# Patient Record
Sex: MALE | Race: WHITE | Employment: FULL TIME | URBAN - METROPOLITAN AREA
[De-identification: names, ages, dates, MRNs, and addresses within clinical notes are randomized per-mention and may not be internally consistent; named-entity substitution may affect disease eponyms.]

---

## 2022-02-28 ENCOUNTER — HOSPITAL ENCOUNTER (OUTPATIENT)
Dept: REHABILITATION | Age: 57
Discharge: HOME | End: 2022-02-28
Payer: COMMERCIAL

## 2022-02-28 ENCOUNTER — HOSPITAL ENCOUNTER (OUTPATIENT)
Dept: SURGERY | Age: 57
Discharge: HOME | End: 2022-02-28
Payer: COMMERCIAL

## 2022-02-28 VITALS
SYSTOLIC BLOOD PRESSURE: 121 MMHG | RESPIRATION RATE: 16 BRPM | BODY MASS INDEX: 23.75 KG/M2 | DIASTOLIC BLOOD PRESSURE: 81 MMHG | OXYGEN SATURATION: 97 % | HEIGHT: 75 IN | WEIGHT: 191 LBS | HEART RATE: 58 BPM | TEMPERATURE: 98.3 F

## 2022-02-28 DIAGNOSIS — R06.83 SNORING: Primary | ICD-10-CM

## 2022-02-28 LAB
ANION GAP SERPL CALC-SCNC: 6 MMOL/L (ref 7–16)
APTT PPP: 31.1 SEC (ref 24.1–35.1)
ATRIAL RATE: 55 BPM
BASOPHILS # BLD: 0.1 K/UL (ref 0–0.2)
BASOPHILS NFR BLD: 1 % (ref 0–2)
BUN SERPL-MCNC: 13 MG/DL (ref 6–23)
CALCIUM SERPL-MCNC: 9.5 MG/DL (ref 8.3–10.4)
CALCULATED P AXIS, ECG09: 11 DEGREES
CALCULATED R AXIS, ECG10: 38 DEGREES
CALCULATED T AXIS, ECG11: 65 DEGREES
CHLORIDE SERPL-SCNC: 107 MMOL/L (ref 98–107)
CO2 SERPL-SCNC: 30 MMOL/L (ref 21–32)
CREAT SERPL-MCNC: 1.03 MG/DL (ref 0.8–1.5)
DIAGNOSIS, 93000: NORMAL
DIFFERENTIAL METHOD BLD: ABNORMAL
EOSINOPHIL # BLD: 0.3 K/UL (ref 0–0.8)
EOSINOPHIL NFR BLD: 5 % (ref 0.5–7.8)
ERYTHROCYTE [DISTWIDTH] IN BLOOD BY AUTOMATED COUNT: 12.5 % (ref 11.9–14.6)
EST. AVERAGE GLUCOSE BLD GHB EST-MCNC: NORMAL MG/DL
GLUCOSE SERPL-MCNC: 105 MG/DL (ref 65–100)
HBA1C MFR BLD: 4.9 % (ref 4.2–6.3)
HCT VFR BLD AUTO: 42.5 % (ref 41.1–50.3)
HGB BLD-MCNC: 14.9 G/DL (ref 13.6–17.2)
IMM GRANULOCYTES # BLD AUTO: 0 K/UL (ref 0–0.5)
IMM GRANULOCYTES NFR BLD AUTO: 0 % (ref 0–5)
INR PPP: 1
LYMPHOCYTES # BLD: 1.7 K/UL (ref 0.5–4.6)
LYMPHOCYTES NFR BLD: 25 % (ref 13–44)
MCH RBC QN AUTO: 31.5 PG (ref 26.1–32.9)
MCHC RBC AUTO-ENTMCNC: 35.1 G/DL (ref 31.4–35)
MCV RBC AUTO: 89.9 FL (ref 79.6–97.8)
MONOCYTES # BLD: 0.6 K/UL (ref 0.1–1.3)
MONOCYTES NFR BLD: 8 % (ref 4–12)
NEUTS SEG # BLD: 4.2 K/UL (ref 1.7–8.2)
NEUTS SEG NFR BLD: 61 % (ref 43–78)
NRBC # BLD: 0 K/UL (ref 0–0.2)
P-R INTERVAL, ECG05: 120 MS
PLATELET # BLD AUTO: 268 K/UL (ref 150–450)
PMV BLD AUTO: 10.6 FL (ref 9.4–12.3)
POTASSIUM SERPL-SCNC: 4.4 MMOL/L (ref 3.5–5.1)
PROTHROMBIN TIME: 13.5 SEC (ref 12.6–14.5)
Q-T INTERVAL, ECG07: 388 MS
QRS DURATION, ECG06: 92 MS
QTC CALCULATION (BEZET), ECG08: 371 MS
RBC # BLD AUTO: 4.73 M/UL (ref 4.23–5.6)
SODIUM SERPL-SCNC: 143 MMOL/L (ref 136–145)
VENTRICULAR RATE, ECG03: 55 BPM
WBC # BLD AUTO: 6.8 K/UL (ref 4.3–11.1)

## 2022-02-28 PROCEDURE — 87641 MR-STAPH DNA AMP PROBE: CPT

## 2022-02-28 PROCEDURE — 94760 N-INVAS EAR/PLS OXIMETRY 1: CPT

## 2022-02-28 PROCEDURE — 77030027138 HC INCENT SPIROMETER -A

## 2022-02-28 PROCEDURE — 83036 HEMOGLOBIN GLYCOSYLATED A1C: CPT

## 2022-02-28 PROCEDURE — 85610 PROTHROMBIN TIME: CPT

## 2022-02-28 PROCEDURE — 36415 COLL VENOUS BLD VENIPUNCTURE: CPT

## 2022-02-28 PROCEDURE — 93005 ELECTROCARDIOGRAM TRACING: CPT

## 2022-02-28 PROCEDURE — 97161 PT EVAL LOW COMPLEX 20 MIN: CPT

## 2022-02-28 PROCEDURE — 85025 COMPLETE CBC W/AUTO DIFF WBC: CPT

## 2022-02-28 PROCEDURE — 85730 THROMBOPLASTIN TIME PARTIAL: CPT

## 2022-02-28 PROCEDURE — 80048 BASIC METABOLIC PNL TOTAL CA: CPT

## 2022-02-28 RX ORDER — PROPRANOLOL HYDROCHLORIDE 80 MG/1
80 CAPSULE, EXTENDED RELEASE ORAL DAILY
COMMUNITY

## 2022-02-28 NOTE — ADVANCED PRACTICE NURSE
Total Joint Surgery Preoperative Chart Review      Patient ID:  Lakeshia Knowles  [id number]  55 y.o.  1965  Surgeon: Dr. Arnulfo Ramos  Date of Surgery: 3/17/2022  Procedure: Total Right Knee Arthroplasty  Primary Care Physician: Montey Aschoff, [phone]  Specialty Physician(s):      Subjective:   Lakeshia Knowles is a 49 y.o. WHITE/NON- male who presents for preoperative evaluation for Total Right Knee arthroplasty. This is a preoperative chart review note based on data collected by the nurse at the surgical Pre-Assessment visit. Past Medical History:   Diagnosis Date    HTN (hypertension)     Migraines     Personal history of COVID-19 11/2020    fatigue, loss of taste & smell      Past Surgical History:   Procedure Laterality Date    HX CHOLECYSTECTOMY  03/2019    HX COLONOSCOPY  03/2019    HX ENDOSCOPY  03/2019    HX HIP REPLACEMENT Right 12/2001     History reviewed. No pertinent family history. Social History     Tobacco Use    Smoking status: Never Smoker    Smokeless tobacco: Never Used   Substance Use Topics    Alcohol use: Never       Prior to Admission medications    Medication Sig Start Date End Date Taking? Authorizing Provider   propranolol LA (INDERAL LA) 80 mg SR capsule Take 80 mg by mouth daily. Indications: migraine prevention   Yes Provider, Historical   lisinopriL (PRINIVIL, ZESTRIL) 10 mg tablet Take 10 mg by mouth daily.  3/4/21 3/4/22 Yes Provider, Historical     Allergies   Allergen Reactions    Penicillins Other (comments)     Childhood allergy    Erythromycin Other (comments)          Objective:     Physical Exam:   Patient Vitals for the past 24 hrs:   Temp Pulse Resp BP SpO2   02/28/22 0903 98.3 °F (36.8 °C) (!) 58 16 121/81 97 %       ECG:    EKG Results     Procedure 720 Value Units Date/Time    EKG, 12 LEAD, INITIAL [[id number]] Collected: 02/28/22 0921    Order Status: Completed Updated: 02/28/22 0949     Ventricular Rate 55 BPM      Atrial Rate 55 BPM      P-R Interval 120 ms      QRS Duration 92 ms      Q-T Interval 388 ms      QTC Calculation (Bezet) 371 ms      Calculated P Axis 11 degrees      Calculated R Axis 38 degrees      Calculated T Axis 65 degrees      Diagnosis --     Sinus bradycardia  Otherwise normal ECG  When compared with ECG of 28-FEB-2022 09:20,  No significant change was found            Data Review:   Labs:   Recent Results (from the past 24 hour(s))   EKG, 12 LEAD, INITIAL    Collection Time: 02/28/22  9:21 AM   Result Value Ref Range    Ventricular Rate 55 BPM    Atrial Rate 55 BPM    P-R Interval 120 ms    QRS Duration 92 ms    Q-T Interval 388 ms    QTC Calculation (Bezet) 371 ms    Calculated P Axis 11 degrees    Calculated R Axis 38 degrees    Calculated T Axis 65 degrees    Diagnosis       Sinus bradycardia  Otherwise normal ECG  When compared with ECG of 28-FEB-2022 09:20,  No significant change was found     CBC WITH AUTOMATED DIFF    Collection Time: 02/28/22  9:30 AM   Result Value Ref Range    WBC 6.8 4.3 - 11.1 K/uL    RBC 4.73 4.23 - 5.6 M/uL    HGB 14.9 13.6 - 17.2 g/dL    HCT 42.5 41.1 - 50.3 %    MCV 89.9 79.6 - 97.8 FL    MCH 31.5 26.1 - 32.9 PG    MCHC 35.1 (H) 31.4 - 35.0 g/dL    RDW 12.5 11.9 - 14.6 %    PLATELET 921 547 - 161 K/uL    MPV 10.6 9.4 - 12.3 FL    ABSOLUTE NRBC 0.00 0.0 - 0.2 K/uL    DF AUTOMATED      NEUTROPHILS 61 43 - 78 %    LYMPHOCYTES 25 13 - 44 %    MONOCYTES 8 4.0 - 12.0 %    EOSINOPHILS 5 0.5 - 7.8 %    BASOPHILS 1 0.0 - 2.0 %    IMMATURE GRANULOCYTES 0 0.0 - 5.0 %    ABS. NEUTROPHILS 4.2 1.7 - 8.2 K/UL    ABS. LYMPHOCYTES 1.7 0.5 - 4.6 K/UL    ABS. MONOCYTES 0.6 0.1 - 1.3 K/UL    ABS. EOSINOPHILS 0.3 0.0 - 0.8 K/UL    ABS. BASOPHILS 0.1 0.0 - 0.2 K/UL    ABS. IMM.  GRANS. 0.0 0.0 - 0.5 K/UL   HEMOGLOBIN A1C WITH EAG    Collection Time: 02/28/22  9:30 AM   Result Value Ref Range    Hemoglobin A1c 4.9 4.20 - 6.30 %    Est. average glucose Cannot be calculated mg/dL   METABOLIC PANEL, BASIC Collection Time: 02/28/22  9:30 AM   Result Value Ref Range    Sodium 143 136 - 145 mmol/L    Potassium 4.4 3.5 - 5.1 mmol/L    Chloride 107 98 - 107 mmol/L    CO2 30 21 - 32 mmol/L    Anion gap 6 (L) 7 - 16 mmol/L    Glucose 105 (H) 65 - 100 mg/dL    BUN 13 6 - 23 MG/DL    Creatinine 1.03 0.8 - 1.5 MG/DL    GFR est AA >60 >60 ml/min/1.73m2    GFR est non-AA >60 >60 ml/min/1.73m2    Calcium 9.5 8.3 - 10.4 MG/DL   PROTHROMBIN TIME + INR    Collection Time: 02/28/22  9:30 AM   Result Value Ref Range    Prothrombin time 13.5 12.6 - 14.5 sec    INR 1.0     PTT    Collection Time: 02/28/22  9:30 AM   Result Value Ref Range    aPTT 31.1 24.1 - 35.1 SEC         Problem List:  )  Patient Active Problem List   Diagnosis Code    Snoring R06.83       Total Joint Surgery Pre-Assessment Recommendations:           Patient reports the symptoms of snoring, fatigue, observed apnea and /or excessive daytime sleepiness. Will refer patient for HST based on above assessment. Recommend continuous saturation monitoring hours of sleep, during hospitalization.         Signed By: JACQUELIN Rodriguez    February 28, 2022

## 2022-02-28 NOTE — PERIOP NOTES
PLEASE CONTINUE TAKING ALL PRESCRIPTION MEDICATIONS UP TO THE DAY OF SURGERY UNLESS OTHERWISE DIRECTED BELOW. DISCONTINUE all vitamins and supplements 21 days prior to surgery. DISCONTINUE Non-Steriodal Anti-Inflammatory (NSAIDS) such as Advil and Aleve 5 days prior to surgery. Home Medications to take  the day of surgery      Propranolol           Home Medications   to Hold           Comments      On the day before surgery please take Acetaminophen 1000mg in the morning and then again before bed. You may substitute for Tylenol 650 mg. Please do not bring home medications with you on the day of surgery unless otherwise directed by your nurse. If you are instructed to bring home medications, please give them to your nurse as they will be administered by the nursing staff. If you have any questions, please call Olga Chavez [phone]     A copy of this note was provided to the patient for reference.

## 2022-02-28 NOTE — PERIOP NOTES
Patient verified name and . Order for consent is found in EHR and matches case posting; patient verified. Type 3 surgery, Joint camp assessment complete. Labs per surgeon: CBC,BMP, PT/PTT, mrsa swab, hgba1c ; results (processing)  Labs per anesthesia protocol: no additional  EKG:done today - within [facility] protocols. MRSA/MSSA swab collected; pharmacy to review and dose antibiotic as appropriate. Hospital approved surgical skin cleanser and instructions to return bottle on DOS given per hospital policy. Patient provided with handouts including Guide to Surgery, Pain Management, Hand Hygiene, Blood Transfusion Education, and [facility] Brochure. Patient answered medical/surgical history questions at their best of ability. All prior to admission medications documented in [facility]. Original medication prescription bottle not visualized during patient appointment. Patient instructed to hold all vitamins 3 weeks prior to surgery and NSAIDS 5 days prior to surgery. Patient teach back successful and patient demonstrates knowledge of instruction.

## 2022-02-28 NOTE — PERIOP NOTES
Your patient recently had labs drawn during a hospital appointment due to an upcoming surgery. The results are attached. If you have any questions or concerns please reach out to your patient for a follow-up in your office. Please do not respond to this message as my mailbox is not monitored. You may call [phone] with questions or concerns. Recent Results (from the past 12 hour(s))   EKG, 12 LEAD, INITIAL    Collection Time: 02/28/22  9:21 AM   Result Value Ref Range    Ventricular Rate 55 BPM    Atrial Rate 55 BPM    P-R Interval 120 ms    QRS Duration 92 ms    Q-T Interval 388 ms    QTC Calculation (Bezet) 371 ms    Calculated P Axis 11 degrees    Calculated R Axis 38 degrees    Calculated T Axis 65 degrees    Diagnosis       Sinus bradycardia  Otherwise normal ECG  When compared with ECG of 28-FEB-2022 09:20,  No significant change was found     CBC WITH AUTOMATED DIFF    Collection Time: 02/28/22  9:30 AM   Result Value Ref Range    WBC 6.8 4.3 - 11.1 K/uL    RBC 4.73 4.23 - 5.6 M/uL    HGB 14.9 13.6 - 17.2 g/dL    HCT 42.5 41.1 - 50.3 %    MCV 89.9 79.6 - 97.8 FL    MCH 31.5 26.1 - 32.9 PG    MCHC 35.1 (H) 31.4 - 35.0 g/dL    RDW 12.5 11.9 - 14.6 %    PLATELET 992 532 - 534 K/uL    MPV 10.6 9.4 - 12.3 FL    ABSOLUTE NRBC 0.00 0.0 - 0.2 K/uL    DF AUTOMATED      NEUTROPHILS 61 43 - 78 %    LYMPHOCYTES 25 13 - 44 %    MONOCYTES 8 4.0 - 12.0 %    EOSINOPHILS 5 0.5 - 7.8 %    BASOPHILS 1 0.0 - 2.0 %    IMMATURE GRANULOCYTES 0 0.0 - 5.0 %    ABS. NEUTROPHILS 4.2 1.7 - 8.2 K/UL    ABS. LYMPHOCYTES 1.7 0.5 - 4.6 K/UL    ABS. MONOCYTES 0.6 0.1 - 1.3 K/UL    ABS. EOSINOPHILS 0.3 0.0 - 0.8 K/UL    ABS. BASOPHILS 0.1 0.0 - 0.2 K/UL    ABS. IMM.  GRANS. 0.0 0.0 - 0.5 K/UL   HEMOGLOBIN A1C WITH EAG    Collection Time: 02/28/22  9:30 AM   Result Value Ref Range    Hemoglobin A1c 4.9 4.20 - 6.30 %    Est. average glucose Cannot be calculated mg/dL   METABOLIC PANEL, BASIC    Collection Time: 02/28/22  9:30 AM Result Value Ref Range    Sodium 143 136 - 145 mmol/L    Potassium 4.4 3.5 - 5.1 mmol/L    Chloride 107 98 - 107 mmol/L    CO2 30 21 - 32 mmol/L    Anion gap 6 (L) 7 - 16 mmol/L    Glucose 105 (H) 65 - 100 mg/dL    BUN 13 6 - 23 MG/DL    Creatinine 1.03 0.8 - 1.5 MG/DL    GFR est AA >60 >60 ml/min/1.73m2    GFR est non-AA >60 >60 ml/min/1.73m2    Calcium 9.5 8.3 - 10.4 MG/DL   PROTHROMBIN TIME + INR    Collection Time: 02/28/22  9:30 AM   Result Value Ref Range    Prothrombin time 13.5 12.6 - 14.5 sec    INR 1.0     PTT    Collection Time: 02/28/22  9:30 AM   Result Value Ref Range    aPTT 31.1 24.1 - 35.1 SEC

## 2022-02-28 NOTE — PERIOP NOTES
Labs done today within [facility] protocols. Recent Results (from the past 12 hour(s))   EKG, 12 LEAD, INITIAL    Collection Time: 02/28/22  9:21 AM   Result Value Ref Range    Ventricular Rate 55 BPM    Atrial Rate 55 BPM    P-R Interval 120 ms    QRS Duration 92 ms    Q-T Interval 388 ms    QTC Calculation (Bezet) 371 ms    Calculated P Axis 11 degrees    Calculated R Axis 38 degrees    Calculated T Axis 65 degrees    Diagnosis       Sinus bradycardia  Otherwise normal ECG  When compared with ECG of 28-FEB-2022 09:20,  No significant change was found     CBC WITH AUTOMATED DIFF    Collection Time: 02/28/22  9:30 AM   Result Value Ref Range    WBC 6.8 4.3 - 11.1 K/uL    RBC 4.73 4.23 - 5.6 M/uL    HGB 14.9 13.6 - 17.2 g/dL    HCT 42.5 41.1 - 50.3 %    MCV 89.9 79.6 - 97.8 FL    MCH 31.5 26.1 - 32.9 PG    MCHC 35.1 (H) 31.4 - 35.0 g/dL    RDW 12.5 11.9 - 14.6 %    PLATELET 460 120 - 708 K/uL    MPV 10.6 9.4 - 12.3 FL    ABSOLUTE NRBC 0.00 0.0 - 0.2 K/uL    DF AUTOMATED      NEUTROPHILS 61 43 - 78 %    LYMPHOCYTES 25 13 - 44 %    MONOCYTES 8 4.0 - 12.0 %    EOSINOPHILS 5 0.5 - 7.8 %    BASOPHILS 1 0.0 - 2.0 %    IMMATURE GRANULOCYTES 0 0.0 - 5.0 %    ABS. NEUTROPHILS 4.2 1.7 - 8.2 K/UL    ABS. LYMPHOCYTES 1.7 0.5 - 4.6 K/UL    ABS. MONOCYTES 0.6 0.1 - 1.3 K/UL    ABS. EOSINOPHILS 0.3 0.0 - 0.8 K/UL    ABS. BASOPHILS 0.1 0.0 - 0.2 K/UL    ABS. IMM.  GRANS. 0.0 0.0 - 0.5 K/UL   HEMOGLOBIN A1C WITH EAG    Collection Time: 02/28/22  9:30 AM   Result Value Ref Range    Hemoglobin A1c 4.9 4.20 - 6.30 %    Est. average glucose Cannot be calculated mg/dL   METABOLIC PANEL, BASIC    Collection Time: 02/28/22  9:30 AM   Result Value Ref Range    Sodium 143 136 - 145 mmol/L    Potassium 4.4 3.5 - 5.1 mmol/L    Chloride 107 98 - 107 mmol/L    CO2 30 21 - 32 mmol/L    Anion gap 6 (L) 7 - 16 mmol/L    Glucose 105 (H) 65 - 100 mg/dL    BUN 13 6 - 23 MG/DL    Creatinine 1.03 0.8 - 1.5 MG/DL    GFR est AA >60 >60 ml/min/1.73m2    GFR est non-AA >60 >60 ml/min/1.73m2    Calcium 9.5 8.3 - 10.4 MG/DL   PROTHROMBIN TIME + INR    Collection Time: 02/28/22  9:30 AM   Result Value Ref Range    Prothrombin time 13.5 12.6 - 14.5 sec    INR 1.0     PTT    Collection Time: 02/28/22  9:30 AM   Result Value Ref Range    aPTT 31.1 24.1 - 35.1 SEC

## 2022-02-28 NOTE — PROGRESS NOTES
Angel Wan  : (57 y.o.) [facility] at [place] [address].  [phone]       Physical Therapy Prehab Plan of Treatment and Evaluation Summary:2022    ICD-10: Treatment Diagnosis:   · Pain in Right Knee (M25.561)  · Stiffness of Right Knee, Not elsewhere classified (M25.661)  · Difficulty in walking, Not elsewhere classified (R26.2)  Precautions/Allergies:   Penicillins and Erythromycin  MEDICAL/REFERRING DIAGNOSIS:  Unilateral primary osteoarthritis, right knee [M17.11]  REFERRING PHYSICIAN: Glenys Colindres,*  DATE OF SURGERY: 3/17/22    Assessment:   Comments:  Pt. Plans to go home with spouse. He had a right malik in . He has partial right foot drop but does not use a brace. PROBLEM LIST (Impacting functional limitations):  Mr. Sherry Friedman presents with the following right lower extremity(s) problems:  1. Strength  2. Range of Motion  3. Home Exercise Program  4. Pain   INTERVENTIONS PLANNED:  1. Home Exercise Program  2. Educational Discussion      TREATMENT PLAN: Effective Dates: 2022 TO 2022. Frequency/Duration: Patient to continue to perform home exercise program at least twice per day up until his surgery. GOALS: (Goals have been discussed and agreed upon with patient.)  Discharge Goals: Time Frame: 1 Day  1. Patient will demonstrate independence with a home exercise program designed to increase strength, range of motion and pain control to minimize functional deficits and optimize patient for total joint replacement. Rehabilitation Potential For Stated Goals: Good  Regarding Dieter Crowley therapy, I certify that the treatment plan above will be carried out by a therapist or under their direction.   Thank you for this referral,  Braden Rivero, PT               HISTORY:   Present Symptoms:  Pain Intensity 1:  (8 at worst)  Pain Location 1: Knee   History of Present Injury/Illness (Reason for Referral):  Medical/Referring Diagnosis: Unilateral primary osteoarthritis, right knee [M17.11]   Past Medical History/Comorbidities:   Mr. Naya Mac  has a past medical history of HTN (hypertension), Migraines, and Personal history of COVID-19 (11/2020). Mr. Naya Mac  has a past surgical history that includes hx hip replacement (Right, 12/2001); hx cholecystectomy (03/2019); hx colonoscopy (03/2019); and hx endoscopy (03/2019).   Social History/Living Environment:   Home Environment: Private residence  # Steps to Enter: 4  Rails to Enter: No  One/Two Story Residence: One story  Living Alone: No  Support Systems: Spouse/Significant Other  Patient Expects to be Discharged to[de-identified] Home  Current DME Used/Available at Home: None  Tub or Shower Type: Tub/Shower combination    Work/Activity:  Desk work  Dominant Side:  RIGHT  Current Medications:  See Pre-assessment nursing note   Number of Personal Factors/Comorbidities that affect the Plan of Care: 1-2: MODERATE COMPLEXITY   EXAMINATION:   ADLs (Current Functional Status):   Ambulation:  [x] Independent  [] Walk Indoors Only  [] Walk Outdoors  [] Use Assistive Device  [] Use Wheelchair Only Dressing:  [x] [address] from Someone for:  [] Sock/Shoes  [] Pants  [] Everything   Bathing/Showering:   [x] Independent  [] Requires Assistance from Someone  [] [address] Only Household Activities:  [x] Routine house and yard work  [] Light Housework Only  [] None   Observation/Orthostatic Postural Assessment:   Exceptions to WDL   ROM/Flexibility:   Gross Assessment: Yes  AROM: Within functional limits (left LE)                       RLE Assessment  RLE Assessment (WDL): Exceptions to WDL  RLE AROM  R Knee Flexion: 127  R Knee Extension: 11   Strength:   Gross Assessment: Yes  Strength: Generally decreased, functional (left LE)              RLE Strength  R Knee Flexion: 4  R Knee Extension: 4   Functional Mobility:    Gross Assessment: Yes    Gait Description (WDL): Exceptions to WDL  Stand to Sit: Independent  Sit to Stand: Independent  Distance (ft): 200 Feet (ft)  Ambulation - Level of Assistance: Independent  Stance: Right decreased  Gait Abnormalities: Foot drop          Balance:    Sitting: Intact  Standing: Intact   Body Structures Involved:  1. Bones  2. Joints  3. Muscles  4. Ligaments Body Functions Affected:  1. Movement Related Activities and Participation Affected:  1. Mobility   Number of elements that affect the Plan of Care: 3: MODERATE COMPLEXITY   CLINICAL PRESENTATION:   Presentation: Stable and uncomplicated: LOW COMPLEXITY   CLINICAL DECISION MAKING:   Tool Used: Knee injury and Osteoarthritis Outcome Score for Joint Replacement (KOOS, JR)  Score:  Initial: 17 (Interval: 44.905) 2/28/2022 Most Recent: TBD   Interpretation of Score: The KOOS, JR contains 7 items from the original KOOS survey. Items are coded from 0 to 4, none to extreme respectively. Shavon Vaca is scored by summing the raw response (range 0-28) and then converting it to an interval score using the table provided below. The interval score ranges from 0 to 100 where 0 represents total knee disability and 100 represents perfect knee health. Medical Necessity:   · Mr. Taz Gallardo is expected to optimize his lower extremity strength and ROM in preparation for joint replacement surgery. Reason for Services/Other Comments:  · Achieve baseline assesment of musculoskeletal system, functional mobility and home environment. , educate in PT HEP in preparation for surgery, educate in hospital plan of care. Use of outcome tool(s) and clinical judgement create a POC that gives a: Clear prediction of patient's progress: LOW COMPLEXITY   TREATMENT:   Treatment/Session Assessment:  Patient was instructed in PT- HEP to increase strength and ROM in LEs. Answered all questions. · Post session pain:  Knee pain  · Compliance with Program/Exercises: compliant most of the time.   Total Treatment Duration:  PT Patient Time In/Time Out  Time In: 0805  Time Out: [address] [facility],

## 2022-02-28 NOTE — PROGRESS NOTES
02/28/22 0730   Oxygen Therapy   O2 Sat (%) 96 %   Pulse via Oximetry 81 beats per minute   O2 Device None (Room air)   Pre-Treatment   Breath Sounds Bilateral Absent   Pt's symptoms include:    Snoring  MORNING HEADACHES  TIREDNESS  HTN  Neck size      41        cm  Modified Tapia stage 3  SACS Score 18  STOP BANG 5  [place] Sleepiness scale 16  Height   6   '  3  \"   Weight  191   lbs  BMI 23.87    Sleepiness Scale:     Sitting and reading 2    Watching TV 3    Sitting inactive in a public place 2    As a passenger in a car for an hour without a break 3    Lying down to rest in the afternoon when circumstances Permits 3    Sitting and talking to someone 0    Sitting quietly after lunch without alcohol 3    In a car, while stopped for a few minutes 0    Total :  16      Refer patient for sleep study based on above assessment. Initial respiratory Assessment completed with pt. Pt was interviewed and evaluated in Joint camp prior to surgery. Patient ID:  Pasha Cheung  [id number]  49 y.o.  1965  Surgeon: Dr. Luciano Robin  Date of Surgery: The linked surgery was not found. Please check manually. Procedure: Total Right Knee Arthroplasty  Primary Care Physician: Sonya Catalan -[phone]  Specialists:     Pt taught proper COUGH technique  DIAPHRAGMATIC BREATHING EXERCISE INSTRUCTIONS GIVEN    History of smoking:   DENIES                 Quit date:         Secondhand smoke:DENIES    Past procedures with Oxygen desaturation or delayed awakening:DENIES    Past Medical History:   Diagnosis Date    HTN (hypertension)     Migraines     Personal history of COVID-19 11/2020    fatigue, loss of taste & smell    HX OF COVID AFTER HUNTING TRIP 11/20/2021- PROD.  COUGH, BODY ACHES, FATIGUE    Respiratory history:DENIES SOB                                                                  Respiratory meds:  DENIES    FAMILY PRESENT:             NO     PAST SLEEP STUDY:                  DENIES  HX OF JUAN: DENIES  JUAN assessment:                                               SLEEPS ON SIDE       &      BACK          PHYSICAL EXAM   Body mass index is 23.87 kg/m². Visit Vitals  /81   Pulse (!) 58   Temp 98.3 °F (36.8 °C)   Resp 16   Ht 6' 3\" (1.905 m)   Wt 86.6 kg (191 lb)   SpO2 97%   BMI 23.87 kg/m²     Neck circumference:  41    cm    Loud snoring:                                                 YES             Witnessed apnea or wakening gasping or choking:        DENIES       Awakens with headaches:                                              YES  Morning or daytime tiredness/ sleepiness:                           TIRED  Dry mouth or sore throat in morning:            YES                                              Tapia stage:  3                                   SACS score:18  Stop Bang   STOP-BANG  Does the patient snore loudly (louder than talking or loud enough to be heard through closed doors)?: Yes  Does the patient often feel tired, fatigued, or sleepy during the daytime, even after a \"good\" night's sleep?: Yes  Has anyone ever observed the patient stop breathing during their sleep? : No  Does the patient have or are they being treated for high blood pressure?: Yes  Is the patient's BMI greater than 35?: No  Is your neck circumference greater than 17 inches (Male) or 16 inches (Female)?: No  Is the patient older than 48?: Yes  Is the patient male?: Yes  JUAN Score: 5  Has the patient been referred to Sleep Medicine?: Yes  Has the patient previously been diagnosed with Obstructive Sleep Apnea?: No                            CS [facility]  RESPIRATORY ASSESSMENT Q SHIFT   O2 PRN    ALBUTEROL  NEBULIZER Q6 PRN WHEEZING                                            Referrals:  [facility]  Pt.  Phone Number:  [phone]

## 2022-03-01 LAB
BACTERIA SPEC CULT: ABNORMAL
SERVICE CMNT-IMP: ABNORMAL

## 2022-03-11 NOTE — H&P
[address] [facility]  History and Physical Exam    Patient ID:  Matthias Worthy  [id number]    88 y.o.  1965    Today: March 11, 2022    Vitals Signs: Reviewed as noted in medical record. Allergies: Allergies   Allergen Reactions    Penicillins Other (comments)     Childhood allergy    Erythromycin Other (comments)       CC: Right knee pain    HPI:  Pt complains of right knee pain and difficulty ambulating. Relevant PMH:   Past Medical History:   Diagnosis Date    HTN (hypertension)     Migraines     Personal history of COVID-19 11/2020    fatigue, loss of taste & smell       Objective:                    HEENT: NC/AT                   Lungs:  clear                   Heart:   rrr                   Abdomen: soft                   Extremities:  Pain with rom of the right knee joint    Radiographs: reveal right knee osteoarthritis with loss of joint space and bone spurs. Assessment: Osteoarthritis of right knee, unspecified osteoarthritis type [M17.11]    Plan:  Proceed with scheduled Procedure(s) (LRB):  RIGHT KNEE ARTHROPLASTY TOTAL ROBOTIC ASSISTED PRASAD REBECCA *SDD* (Right) . The patient has failed conservative treatment including NSAIDS, and injections. Due to the amount of pain the patient is experiencing we will proceed with scheduled procedure. Will plan for same day discharge.     Signed By: FELICITY Tirado  March 11, 2022

## 2022-03-16 ENCOUNTER — ANESTHESIA EVENT (OUTPATIENT)
Dept: SURGERY | Age: 57
End: 2022-03-16
Payer: COMMERCIAL

## 2022-03-16 NOTE — PERIOP NOTES
[address] [facility] Phone Call (performed day before scheduled surgery):    1. Have you have any exposure to anyone who has tested positive for COVID19 in the last 7 days? Patient Response: no    2.    Have you experienced any of the below symptoms in the last 48 hours?      -New onset respiratory symptoms                  -Fever or chills                  -Cough / Congestion / running nose                  -Shortness of breath or difficulty breathing                  -Headache                 -Sore Throat                 -New loss or taste or smell                 -Nausea / Vomiting / Diarrhea           Patient Response: no    Comments: no

## 2022-03-17 ENCOUNTER — ANESTHESIA (OUTPATIENT)
Dept: SURGERY | Age: 57
End: 2022-03-17
Payer: COMMERCIAL

## 2022-03-17 ENCOUNTER — HOSPITAL ENCOUNTER (OUTPATIENT)
Age: 57
Discharge: HOME HEALTH | End: 2022-03-17
Attending: ORTHOPAEDIC SURGERY | Admitting: ORTHOPAEDIC SURGERY
Payer: COMMERCIAL

## 2022-03-17 VITALS
BODY MASS INDEX: 24 KG/M2 | DIASTOLIC BLOOD PRESSURE: 89 MMHG | RESPIRATION RATE: 17 BRPM | TEMPERATURE: 97.4 F | HEIGHT: 75 IN | HEART RATE: 57 BPM | WEIGHT: 193 LBS | OXYGEN SATURATION: 99 % | SYSTOLIC BLOOD PRESSURE: 134 MMHG

## 2022-03-17 DIAGNOSIS — M17.11 ARTHRITIS OF RIGHT KNEE: ICD-10-CM

## 2022-03-17 DIAGNOSIS — Z96.651 STATUS POST TOTAL KNEE REPLACEMENT, RIGHT: Primary | ICD-10-CM

## 2022-03-17 PROCEDURE — 77030007880 HC KT SPN EPDRL BBMI -B: Performed by: ANESTHESIOLOGY

## 2022-03-17 PROCEDURE — 74011250637 HC RX REV CODE- 250/637: Performed by: ANESTHESIOLOGY

## 2022-03-17 PROCEDURE — 77030020044 HC CLD THERAPY UNIT -B

## 2022-03-17 PROCEDURE — 74011000250 HC RX REV CODE- 250: Performed by: ORTHOPAEDIC SURGERY

## 2022-03-17 PROCEDURE — 77030002933 HC SUT MCRYL J&J -A: Performed by: ORTHOPAEDIC SURGERY

## 2022-03-17 PROCEDURE — 74011250636 HC RX REV CODE- 250/636: Performed by: ANESTHESIOLOGY

## 2022-03-17 PROCEDURE — 2709999900 HC NON-CHARGEABLE SUPPLY: Performed by: ORTHOPAEDIC SURGERY

## 2022-03-17 PROCEDURE — 77030018673: Performed by: ORTHOPAEDIC SURGERY

## 2022-03-17 PROCEDURE — 97530 THERAPEUTIC ACTIVITIES: CPT

## 2022-03-17 PROCEDURE — 27447 TOTAL KNEE ARTHROPLASTY: CPT | Performed by: ORTHOPAEDIC SURGERY

## 2022-03-17 PROCEDURE — 74011250636 HC RX REV CODE- 250/636: Performed by: PHYSICIAN ASSISTANT

## 2022-03-17 PROCEDURE — C1776 JOINT DEVICE (IMPLANTABLE): HCPCS | Performed by: ORTHOPAEDIC SURGERY

## 2022-03-17 PROCEDURE — 76210000006 HC OR PH I REC 0.5 TO 1 HR: Performed by: ORTHOPAEDIC SURGERY

## 2022-03-17 PROCEDURE — 74011250636 HC RX REV CODE- 250/636: Performed by: NURSE ANESTHETIST, CERTIFIED REGISTERED

## 2022-03-17 PROCEDURE — 74011000258 HC RX REV CODE- 258: Performed by: ORTHOPAEDIC SURGERY

## 2022-03-17 PROCEDURE — 77030039760: Performed by: ORTHOPAEDIC SURGERY

## 2022-03-17 PROCEDURE — 77030019557 HC ELECTRD VES SEAL MEDT -F: Performed by: ORTHOPAEDIC SURGERY

## 2022-03-17 PROCEDURE — 74011000250 HC RX REV CODE- 250: Performed by: NURSE ANESTHETIST, CERTIFIED REGISTERED

## 2022-03-17 PROCEDURE — 76010000172 HC OR TIME 2.5 TO 3 HR INTENSV-TIER 1: Performed by: ORTHOPAEDIC SURGERY

## 2022-03-17 PROCEDURE — 77030029828 HC FEM TIB CKPNT KT DISP STRY -B: Performed by: ORTHOPAEDIC SURGERY

## 2022-03-17 PROCEDURE — 77030031139 HC SUT VCRL2 J&J -A: Performed by: ORTHOPAEDIC SURGERY

## 2022-03-17 PROCEDURE — 77030012935 HC DRSG AQUACEL BMS -B: Performed by: ORTHOPAEDIC SURGERY

## 2022-03-17 PROCEDURE — 97535 SELF CARE MNGMENT TRAINING: CPT

## 2022-03-17 PROCEDURE — 77030002966 HC SUT PDS J&J -A: Performed by: ORTHOPAEDIC SURGERY

## 2022-03-17 PROCEDURE — 77030008462 HC STPLR SKN PROX J&J -A: Performed by: ORTHOPAEDIC SURGERY

## 2022-03-17 PROCEDURE — 76060000036 HC ANESTHESIA 2.5 TO 3 HR: Performed by: ORTHOPAEDIC SURGERY

## 2022-03-17 PROCEDURE — 74011000250 HC RX REV CODE- 250: Performed by: ANESTHESIOLOGY

## 2022-03-17 PROCEDURE — 76942 ECHO GUIDE FOR BIOPSY: CPT | Performed by: ORTHOPAEDIC SURGERY

## 2022-03-17 PROCEDURE — 77030029820: Performed by: ORTHOPAEDIC SURGERY

## 2022-03-17 PROCEDURE — 77030003602 HC NDL NRV BLK BBMI -B: Performed by: ANESTHESIOLOGY

## 2022-03-17 PROCEDURE — 97161 PT EVAL LOW COMPLEX 20 MIN: CPT

## 2022-03-17 PROCEDURE — 74011250637 HC RX REV CODE- 250/637: Performed by: PHYSICIAN ASSISTANT

## 2022-03-17 PROCEDURE — 97165 OT EVAL LOW COMPLEX 30 MIN: CPT

## 2022-03-17 PROCEDURE — 74011250636 HC RX REV CODE- 250/636: Performed by: ORTHOPAEDIC SURGERY

## 2022-03-17 PROCEDURE — 76010010054 HC POST OP PAIN BLOCK: Performed by: ORTHOPAEDIC SURGERY

## 2022-03-17 PROCEDURE — 77030040922 HC BLNKT HYPOTHRM STRY -A: Performed by: ANESTHESIOLOGY

## 2022-03-17 PROCEDURE — 74011000250 HC RX REV CODE- 250: Performed by: PHYSICIAN ASSISTANT

## 2022-03-17 PROCEDURE — 77030003665 HC NDL SPN BBMI -A: Performed by: ANESTHESIOLOGY

## 2022-03-17 DEVICE — KNEE K2 TOT HEMI ADV CMTLS -- IMPL CAPPED K2: Type: IMPLANTABLE DEVICE | Status: FUNCTIONAL

## 2022-03-17 DEVICE — BASEPLATE TIB SZ 6 AP52MM ML77MM KNEE TRITANIUM 4 CRUCFRM: Type: IMPLANTABLE DEVICE | Site: KNEE | Status: FUNCTIONAL

## 2022-03-17 DEVICE — COMPNT FEM CR TRIATHLN 5 R PA --: Type: IMPLANTABLE DEVICE | Site: KNEE | Status: FUNCTIONAL

## 2022-03-17 DEVICE — INSERT TIB SZ 6 THK9MM UNIV KNEE POLYETH CNDYL STBL PRI NEUT: Type: IMPLANTABLE DEVICE | Site: KNEE | Status: FUNCTIONAL

## 2022-03-17 RX ORDER — CEFAZOLIN SODIUM/WATER 2 G/20 ML
2 SYRINGE (ML) INTRAVENOUS EVERY 8 HOURS
Status: DISCONTINUED | OUTPATIENT
Start: 2022-03-17 | End: 2022-03-17 | Stop reason: HOSPADM

## 2022-03-17 RX ORDER — SODIUM CHLORIDE, SODIUM LACTATE, POTASSIUM CHLORIDE, CALCIUM CHLORIDE 600; 310; 30; 20 MG/100ML; MG/100ML; MG/100ML; MG/100ML
75 INJECTION, SOLUTION INTRAVENOUS CONTINUOUS
Status: DISCONTINUED | OUTPATIENT
Start: 2022-03-17 | End: 2022-03-17 | Stop reason: HOSPADM

## 2022-03-17 RX ORDER — ONDANSETRON 4 MG/1
4 TABLET, ORALLY DISINTEGRATING ORAL
Status: DISCONTINUED | OUTPATIENT
Start: 2022-03-17 | End: 2022-03-17 | Stop reason: HOSPADM

## 2022-03-17 RX ORDER — SODIUM CHLORIDE 0.9 % (FLUSH) 0.9 %
5-40 SYRINGE (ML) INJECTION AS NEEDED
Status: DISCONTINUED | OUTPATIENT
Start: 2022-03-17 | End: 2022-03-17 | Stop reason: HOSPADM

## 2022-03-17 RX ORDER — ACETAMINOPHEN 500 MG
1000 TABLET ORAL ONCE
Status: COMPLETED | OUTPATIENT
Start: 2022-03-17 | End: 2022-03-17

## 2022-03-17 RX ORDER — ACETAMINOPHEN 500 MG
1000 TABLET ORAL EVERY 6 HOURS
Status: DISCONTINUED | OUTPATIENT
Start: 2022-03-17 | End: 2022-03-17 | Stop reason: HOSPADM

## 2022-03-17 RX ORDER — PROPOFOL 10 MG/ML
INJECTION, EMULSION INTRAVENOUS
Status: DISCONTINUED | OUTPATIENT
Start: 2022-03-17 | End: 2022-03-17 | Stop reason: HOSPADM

## 2022-03-17 RX ORDER — ROPIVACAINE HYDROCHLORIDE 2 MG/ML
INJECTION, SOLUTION EPIDURAL; INFILTRATION; PERINEURAL AS NEEDED
Status: DISCONTINUED | OUTPATIENT
Start: 2022-03-17 | End: 2022-03-17 | Stop reason: HOSPADM

## 2022-03-17 RX ORDER — KETOROLAC TROMETHAMINE 30 MG/ML
INJECTION, SOLUTION INTRAMUSCULAR; INTRAVENOUS AS NEEDED
Status: DISCONTINUED | OUTPATIENT
Start: 2022-03-17 | End: 2022-03-17 | Stop reason: HOSPADM

## 2022-03-17 RX ORDER — FLUMAZENIL 0.1 MG/ML
0.2 INJECTION INTRAVENOUS
Status: DISCONTINUED | OUTPATIENT
Start: 2022-03-17 | End: 2022-03-17 | Stop reason: HOSPADM

## 2022-03-17 RX ORDER — DEXAMETHASONE SODIUM PHOSPHATE 100 MG/10ML
10 INJECTION INTRAMUSCULAR; INTRAVENOUS ONCE
Status: DISCONTINUED | OUTPATIENT
Start: 2022-03-18 | End: 2022-03-17 | Stop reason: HOSPADM

## 2022-03-17 RX ORDER — OXYCODONE HYDROCHLORIDE 5 MG/1
5-10 TABLET ORAL
Qty: 60 TABLET | Refills: 0 | Status: SHIPPED | OUTPATIENT
Start: 2022-03-17 | End: 2022-03-17 | Stop reason: SINTOL

## 2022-03-17 RX ORDER — DIPHENHYDRAMINE HCL 25 MG
25 CAPSULE ORAL
Status: DISCONTINUED | OUTPATIENT
Start: 2022-03-17 | End: 2022-03-17 | Stop reason: HOSPADM

## 2022-03-17 RX ORDER — VANCOMYCIN HYDROCHLORIDE 1 G/20ML
INJECTION, POWDER, LYOPHILIZED, FOR SOLUTION INTRAVENOUS AS NEEDED
Status: DISCONTINUED | OUTPATIENT
Start: 2022-03-17 | End: 2022-03-17 | Stop reason: HOSPADM

## 2022-03-17 RX ORDER — FENTANYL CITRATE 50 UG/ML
100 INJECTION, SOLUTION INTRAMUSCULAR; INTRAVENOUS
Status: COMPLETED | OUTPATIENT
Start: 2022-03-17 | End: 2022-03-17

## 2022-03-17 RX ORDER — SODIUM CHLORIDE 0.9 % (FLUSH) 0.9 %
5-40 SYRINGE (ML) INJECTION EVERY 8 HOURS
Status: DISCONTINUED | OUTPATIENT
Start: 2022-03-17 | End: 2022-03-17 | Stop reason: HOSPADM

## 2022-03-17 RX ORDER — DEXAMETHASONE SODIUM PHOSPHATE 4 MG/ML
INJECTION, SOLUTION INTRA-ARTICULAR; INTRALESIONAL; INTRAMUSCULAR; INTRAVENOUS; SOFT TISSUE
Status: COMPLETED | OUTPATIENT
Start: 2022-03-17 | End: 2022-03-17

## 2022-03-17 RX ORDER — HALOPERIDOL 5 MG/ML
1 INJECTION INTRAMUSCULAR
Status: DISCONTINUED | OUTPATIENT
Start: 2022-03-17 | End: 2022-03-17 | Stop reason: HOSPADM

## 2022-03-17 RX ORDER — ONDANSETRON 4 MG/1
4 TABLET, ORALLY DISINTEGRATING ORAL
Qty: 30 TABLET | Refills: 0 | Status: SHIPPED | OUTPATIENT
Start: 2022-03-17

## 2022-03-17 RX ORDER — MIDAZOLAM HYDROCHLORIDE 1 MG/ML
2 INJECTION, SOLUTION INTRAMUSCULAR; INTRAVENOUS
Status: DISCONTINUED | OUTPATIENT
Start: 2022-03-17 | End: 2022-03-17 | Stop reason: HOSPADM

## 2022-03-17 RX ORDER — CELECOXIB 200 MG/1
200 CAPSULE ORAL ONCE
Status: COMPLETED | OUTPATIENT
Start: 2022-03-17 | End: 2022-03-17

## 2022-03-17 RX ORDER — TRANEXAMIC ACID 100 MG/ML
INJECTION, SOLUTION INTRAVENOUS AS NEEDED
Status: DISCONTINUED | OUTPATIENT
Start: 2022-03-17 | End: 2022-03-17 | Stop reason: HOSPADM

## 2022-03-17 RX ORDER — ONDANSETRON 2 MG/ML
INJECTION INTRAMUSCULAR; INTRAVENOUS AS NEEDED
Status: DISCONTINUED | OUTPATIENT
Start: 2022-03-17 | End: 2022-03-17 | Stop reason: HOSPADM

## 2022-03-17 RX ORDER — CELECOXIB 200 MG/1
200 CAPSULE ORAL EVERY 12 HOURS
Status: DISCONTINUED | OUTPATIENT
Start: 2022-03-17 | End: 2022-03-17 | Stop reason: HOSPADM

## 2022-03-17 RX ORDER — HYDROMORPHONE HYDROCHLORIDE 2 MG/ML
0.5 INJECTION, SOLUTION INTRAMUSCULAR; INTRAVENOUS; SUBCUTANEOUS
Status: DISCONTINUED | OUTPATIENT
Start: 2022-03-17 | End: 2022-03-17 | Stop reason: HOSPADM

## 2022-03-17 RX ORDER — OXYCODONE HYDROCHLORIDE 5 MG/1
5 TABLET ORAL
Status: DISCONTINUED | OUTPATIENT
Start: 2022-03-17 | End: 2022-03-17 | Stop reason: HOSPADM

## 2022-03-17 RX ORDER — CEFAZOLIN SODIUM/WATER 2 G/20 ML
2 SYRINGE (ML) INTRAVENOUS ONCE
Status: COMPLETED | OUTPATIENT
Start: 2022-03-17 | End: 2022-03-17

## 2022-03-17 RX ORDER — HYDROMORPHONE HYDROCHLORIDE 1 MG/ML
1 INJECTION, SOLUTION INTRAMUSCULAR; INTRAVENOUS; SUBCUTANEOUS
Status: DISCONTINUED | OUTPATIENT
Start: 2022-03-17 | End: 2022-03-17 | Stop reason: HOSPADM

## 2022-03-17 RX ORDER — ACETAMINOPHEN 325 MG/1
975 TABLET ORAL ONCE
Status: DISCONTINUED | OUTPATIENT
Start: 2022-03-17 | End: 2022-03-17 | Stop reason: SDUPTHER

## 2022-03-17 RX ORDER — ASPIRIN 81 MG/1
81 TABLET ORAL EVERY 12 HOURS
Qty: 70 TABLET | Refills: 0 | Status: SHIPPED | OUTPATIENT
Start: 2022-03-17 | End: 2022-04-21

## 2022-03-17 RX ORDER — SODIUM CHLORIDE, SODIUM LACTATE, POTASSIUM CHLORIDE, CALCIUM CHLORIDE 600; 310; 30; 20 MG/100ML; MG/100ML; MG/100ML; MG/100ML
100 INJECTION, SOLUTION INTRAVENOUS CONTINUOUS
Status: DISCONTINUED | OUTPATIENT
Start: 2022-03-17 | End: 2022-03-17 | Stop reason: HOSPADM

## 2022-03-17 RX ORDER — SODIUM CHLORIDE 9 MG/ML
100 INJECTION, SOLUTION INTRAVENOUS CONTINUOUS
Status: DISCONTINUED | OUTPATIENT
Start: 2022-03-17 | End: 2022-03-17 | Stop reason: HOSPADM

## 2022-03-17 RX ORDER — LIDOCAINE HYDROCHLORIDE 10 MG/ML
0.1 INJECTION INFILTRATION; PERINEURAL AS NEEDED
Status: DISCONTINUED | OUTPATIENT
Start: 2022-03-17 | End: 2022-03-17 | Stop reason: HOSPADM

## 2022-03-17 RX ORDER — MIDAZOLAM HYDROCHLORIDE 1 MG/ML
2 INJECTION, SOLUTION INTRAMUSCULAR; INTRAVENOUS
Status: COMPLETED | OUTPATIENT
Start: 2022-03-17 | End: 2022-03-17

## 2022-03-17 RX ORDER — NALOXONE HYDROCHLORIDE 0.4 MG/ML
0.1 INJECTION, SOLUTION INTRAMUSCULAR; INTRAVENOUS; SUBCUTANEOUS
Status: DISCONTINUED | OUTPATIENT
Start: 2022-03-17 | End: 2022-03-17 | Stop reason: HOSPADM

## 2022-03-17 RX ORDER — PROPRANOLOL HYDROCHLORIDE 80 MG/1
80 CAPSULE, EXTENDED RELEASE ORAL DAILY
Status: DISCONTINUED | OUTPATIENT
Start: 2022-03-18 | End: 2022-03-17 | Stop reason: HOSPADM

## 2022-03-17 RX ORDER — ASPIRIN 81 MG/1
81 TABLET ORAL EVERY 12 HOURS
Status: DISCONTINUED | OUTPATIENT
Start: 2022-03-17 | End: 2022-03-17 | Stop reason: HOSPADM

## 2022-03-17 RX ORDER — DIPHENHYDRAMINE HYDROCHLORIDE 50 MG/ML
12.5 INJECTION, SOLUTION INTRAMUSCULAR; INTRAVENOUS
Status: DISCONTINUED | OUTPATIENT
Start: 2022-03-17 | End: 2022-03-17 | Stop reason: HOSPADM

## 2022-03-17 RX ORDER — NALOXONE HYDROCHLORIDE 0.4 MG/ML
.2-.4 INJECTION, SOLUTION INTRAMUSCULAR; INTRAVENOUS; SUBCUTANEOUS
Status: DISCONTINUED | OUTPATIENT
Start: 2022-03-17 | End: 2022-03-17 | Stop reason: HOSPADM

## 2022-03-17 RX ORDER — LISINOPRIL 10 MG/1
10 TABLET ORAL DAILY
COMMUNITY

## 2022-03-17 RX ORDER — ACETAMINOPHEN 650 MG/1
650 SUPPOSITORY RECTAL ONCE
Status: DISCONTINUED | OUTPATIENT
Start: 2022-03-17 | End: 2022-03-17 | Stop reason: SDUPTHER

## 2022-03-17 RX ORDER — AMOXICILLIN 250 MG
2 CAPSULE ORAL DAILY
Status: DISCONTINUED | OUTPATIENT
Start: 2022-03-18 | End: 2022-03-17 | Stop reason: HOSPADM

## 2022-03-17 RX ORDER — DEXAMETHASONE SODIUM PHOSPHATE 4 MG/ML
INJECTION, SOLUTION INTRA-ARTICULAR; INTRALESIONAL; INTRAMUSCULAR; INTRAVENOUS; SOFT TISSUE AS NEEDED
Status: DISCONTINUED | OUTPATIENT
Start: 2022-03-17 | End: 2022-03-17 | Stop reason: HOSPADM

## 2022-03-17 RX ORDER — OXYCODONE HYDROCHLORIDE 5 MG/1
10 TABLET ORAL
Status: DISCONTINUED | OUTPATIENT
Start: 2022-03-17 | End: 2022-03-17 | Stop reason: HOSPADM

## 2022-03-17 RX ADMIN — Medication 2 G: at 08:35

## 2022-03-17 RX ADMIN — ACETAMINOPHEN 1000 MG: 500 TABLET, FILM COATED ORAL at 06:40

## 2022-03-17 RX ADMIN — OXYCODONE 10 MG: 5 TABLET ORAL at 12:43

## 2022-03-17 RX ADMIN — Medication 3 AMPULE: at 06:40

## 2022-03-17 RX ADMIN — OXYCODONE 10 MG: 5 TABLET ORAL at 16:13

## 2022-03-17 RX ADMIN — TRANEXAMIC ACID 1 G: 100 INJECTION, SOLUTION INTRAVENOUS at 08:41

## 2022-03-17 RX ADMIN — PROPOFOL 50 MG: 10 INJECTION, EMULSION INTRAVENOUS at 08:30

## 2022-03-17 RX ADMIN — DEXAMETHASONE SODIUM PHOSPHATE 10 MG: 4 INJECTION, SOLUTION INTRA-ARTICULAR; INTRALESIONAL; INTRAMUSCULAR; INTRAVENOUS; SOFT TISSUE at 08:37

## 2022-03-17 RX ADMIN — ONDANSETRON 4 MG: 4 TABLET, ORALLY DISINTEGRATING ORAL at 12:39

## 2022-03-17 RX ADMIN — MEPIVACAINE HYDROCHLORIDE 60 MG: 20 INJECTION, SOLUTION EPIDURAL; INFILTRATION at 08:26

## 2022-03-17 RX ADMIN — ROPIVACAINE HYDROCHLORIDE 20 ML: 2 INJECTION, SOLUTION EPIDURAL; INFILTRATION at 07:41

## 2022-03-17 RX ADMIN — SODIUM CHLORIDE, SODIUM LACTATE, POTASSIUM CHLORIDE, AND CALCIUM CHLORIDE 100 ML/HR: 600; 310; 30; 20 INJECTION, SOLUTION INTRAVENOUS at 06:53

## 2022-03-17 RX ADMIN — ONDANSETRON 4 MG: 2 INJECTION INTRAMUSCULAR; INTRAVENOUS at 08:37

## 2022-03-17 RX ADMIN — CELECOXIB 200 MG: 200 CAPSULE ORAL at 06:40

## 2022-03-17 RX ADMIN — FENTANYL CITRATE 100 MCG: 50 INJECTION INTRAMUSCULAR; INTRAVENOUS at 07:38

## 2022-03-17 RX ADMIN — PROPOFOL 100 MCG/KG/MIN: 10 INJECTION, EMULSION INTRAVENOUS at 08:31

## 2022-03-17 RX ADMIN — LIDOCAINE HYDROCHLORIDE 0.1 ML: 10 INJECTION, SOLUTION INFILTRATION; PERINEURAL at 06:52

## 2022-03-17 RX ADMIN — ONDANSETRON 4 MG: 4 TABLET, ORALLY DISINTEGRATING ORAL at 16:11

## 2022-03-17 RX ADMIN — DEXAMETHASONE SODIUM PHOSPHATE 4 MG: 4 INJECTION, SOLUTION INTRAMUSCULAR; INTRAVENOUS at 07:41

## 2022-03-17 RX ADMIN — CEFAZOLIN SODIUM 2 G: 100 INJECTION, POWDER, LYOPHILIZED, FOR SOLUTION INTRAVENOUS at 16:10

## 2022-03-17 RX ADMIN — SODIUM CHLORIDE, PRESERVATIVE FREE 10 ML: 5 INJECTION INTRAVENOUS at 14:27

## 2022-03-17 RX ADMIN — MIDAZOLAM 2 MG: 1 INJECTION INTRAMUSCULAR; INTRAVENOUS at 07:38

## 2022-03-17 NOTE — PROGRESS NOTES
Care Management Interventions  PCP Verified by CM: Yes  Mode of Transport at Discharge: Self  Transition of Care Consult (CM Consult): [address]: No  Reason Outside [place]: Out of service area  Physical Therapy Consult: Yes  Occupational Therapy Consult: Yes  Support Systems: Friend/Neighbor  Confirm Follow Up Transport: Self  The Plan for Transition of Care is Related to the Following Treatment Goals : improve mobility  The Patient and/or Patient Representative was Provided with a Choice of Provider and Agrees with the Discharge Plan?: Yes  Freedom of Choice List was Provided with Basic Dialogue that Supports the Patient's Individualized Plan of Care/Goals, Treatment Preferences and Shares the Quality Data Associated with the Providers?: Yes  Discharge Location  Patient Expects to be Discharged to[de-identified] Home with home health  Patient is a 49y.o. year old male admitted for Right TKA . Patient plans to return home on discharge. Order received to arrange home health. Patient requested [facility]. [facility].  Patient requesting we arrange a walker and bedside commode. Referral sent to [address] [place] [address]. delivered to the hospital room prior to discharge. Will follow until discharge.

## 2022-03-17 NOTE — PERIOP NOTES
Betadine lavage:  17.5cc of betadine lot # [id number]  , exp. Date 05/01/23 ,  in 500cc of . 9NS Lot # [id number] , exp.  Date 1/10/2024 :

## 2022-03-17 NOTE — PROGRESS NOTES
Discharge instructions and education completed. Prescriptions reviewed with patient and wife at bedside. Aquacel given to patient. Opportunity for questions and clarification provided. Patient verbalizes understanding. Patient discharged in stable condition to car via wheelchair.

## 2022-03-17 NOTE — PROGRESS NOTES
Problem: Mobility Impaired (Adult and Pediatric)  Goal: *Acute Goals and Plan of Care (Insert Text)  Outcome: Progressing Towards Goal  Note: GOALS (1-4 days):  (1.)Mr. Leilani Burleson will move from supine to sit and sit to supine  in bed with INDEPENDENT. (2.)Mr. Leilani Burleson will transfer from bed to chair and chair to bed with SUPERVISION using the least restrictive device. (3.)Mr. Leilani Burleson will ambulate with SUPERVISION for 300 feet with the least restrictive device. (4.)Mr. Leilani Burleson will ambulate up/down 4 steps without a railing with STAND BY ASSIST with no device. (5.)Mr. Leilani Burleson will increase right knee ROM to 0°-90°.  ________________________________________________________________________________________________       PHYSICAL THERAPY JOINT CAMP TKA: Initial Assessment and PM 3/17/2022  OUTPATIENT: Hospital Day: 1  Payor: [place] Form / Plan: [facility] / Product Type: PPO /      NAME/AGE/GENDER: Marilynn Camp is a 49 y.o. male   PRIMARY DIAGNOSIS:  Osteoarthritis of right knee, unspecified osteoarthritis type [M17.11]   Procedure(s) and Anesthesia Type:     * RIGHT KNEE ARTHROPLASTY TOTAL ROBOTIC ASSISTED PRASAD FERNANDEZ *SDD* - Spinal (Right)  ICD-10: Treatment Diagnosis:    Pain in Right Knee (M25.561)  Stiffness of Right Knee, Not elsewhere classified (M25.661)  Difficulty in walking, Not elsewhere classified (R26.2)      ASSESSMENT:     Mr. Leilani Burleson presents s/p R TKA. Patient demonstrates decreased R LE strength and ROM and decreased independence with mobility. Patient would benefit from therapy to address these deficits prior to d/c home. Patient participated well with the assessment. Good demonstration of exercises with good tolerance of ROM. Patient ambulated well with the RW and able to negotiate steps without a rail with CGA. He is planning to d/c home today and safe to do so. Educated on HEP, ambulation with RW, positioning with pillow under distal LE and use of ice.   Patient with no additional questions. Will continue therapy if patient does not d/c home as planned. This section established at most recent assessment   PROBLEM LIST (Impairments causing functional limitations):  Decreased Strength  Decreased ADL/Functional Activities  Decreased Transfer Abilities  Decreased Ambulation Ability/Technique  Decreased Flexibility/Joint Mobility  Edema/Girth  Decreased [place] with Home Exercise Program   INTERVENTIONS PLANNED: (Benefits and precautions of physical therapy have been discussed with the patient.)  Bed Mobility  Cold  Gait Training  Home Exercise Program (HEP)  Range of Motion (ROM)  Therapeutic Activites  Therapeutic Exercise/Strengthening  Transfer Training     TREATMENT PLAN: Frequency/Duration: Follow patient BID for duration of hospital stay to address above goals. Rehabilitation Potential For Stated Goals: Good     RECOMMENDED REHABILITATION/EQUIPMENT: (at time of discharge pending progress): Continue Skilled Therapy. HISTORY:   History of Present Injury/Illness (Reason for Referral):  Pt s/p total knee arthroplasty on 3/17/22  Past Medical History/Comorbidities:   Mr. Naya Mac  has a past medical history of HTN (hypertension), Migraines, and Personal history of COVID-19 (11/2020). Mr. Naya Mac  has a past surgical history that includes hx hip replacement (Right, 12/2001); hx cholecystectomy (03/2019); hx colonoscopy (03/2019); and hx endoscopy (03/2019).   Social History/Living Environment:   Home Environment: Private residence  # Steps to Enter: 4  Rails to Enter: No  One/Two Story Residence: One story  Living Alone: No  Support Systems: Spouse/Significant Other  Patient Expects to be Discharged to[de-identified] Home with home health  Current DME Used/Available at Home: None  Tub or Shower Type: Tub/Shower combination  Prior Level of Function/Work/Activity:  Independent    Number of Personal Factors/Comorbidities that affect the Plan of Care: 0: LOW COMPLEXITY EXAMINATION:   Most Recent Physical Functioning:      Gross Assessment  AROM: Within functional limits (L LE)  Strength: Within functional limits (L LE)                RLE Strength  R Knee Flexion: 2+  R Knee Extension: 2+    Bed Mobility  Supine to Sit: Stand-by assistance  Scooting: Stand-by assistance    Transfers  Sit to Stand: Stand-by assistance  Stand to Sit: Stand-by assistance  Bed to Chair: Stand-by assistance    Balance  Sitting: Intact  Standing: With support              Weight Bearing Status  Right Side Weight Bearing: As tolerated  Distance (ft): 320 Feet (ft)  Ambulation - Level of Assistance: Stand-by assistance  Assistive Device: Walker, rolling  Speed/Sandi: Pace decreased (<100 feet/min)  Step Length: Left shortened;Right shortened  Stance: Right decreased  Number of Stairs Trained: 5  Stairs - Level of Assistance: Contact guard assistance  Interventions: Safety awareness training;Verbal cues     Braces/Orthotics: none    Right Knee Cold  Type: Cryocuff      Body Structures Involved:  Joints  Muscles Body Functions Affected: Movement Related Activities and Participation Affected: Mobility  Self Care   Number of elements that affect the Plan of Care: 4+: HIGH COMPLEXITY   CLINICAL PRESENTATION:   Presentation: Stable and uncomplicated: LOW COMPLEXITY   CLINICAL DECISION MAKIN[address] [place] [address] AM-PAC 6 Clicks   Basic Mobility Inpatient Short Form  How much difficulty does the patient currently have. .. Unable A Lot A Little None   1. Turning over in bed (including adjusting bedclothes, sheets and blankets)? [] 1   [] 2   [x] 3   [] 4   2. Sitting down on and standing up from a chair with arms ( e.g., wheelchair, bedside commode, etc.)   [] 1   [] 2   [x] 3   [] 4   3. Moving from lying on back to sitting on the side of the bed? [] 1   [] 2   [x] 3   [] 4   How much help from another person does the patient currently need. .. Total A Lot A Little None   4.   Moving to and from a bed to a chair (including a wheelchair)? [] 1   [] 2   [x] 3   [] 4   5. Need to walk in hospital room? [] 1   [] 2   [x] 3   [] 4   6. Climbing 3-5 steps with a railing? [] 1   [] 2   [x] 3   [] 4   © 2007, Trustees of [address] [place] [address] [facility], under license to [employer]. All rights reserved     Score:  Initial: 18 Most Recent: X (Date: -- )    Interpretation of Tool:  Represents activities that are increasingly more difficult (i.e. Bed mobility, Transfers, Gait). Medical Necessity:     Patient is expected to demonstrate progress in   strength, range of motion, and functional technique   to   decrease assistance required with functional mobility and TKA managment  . Reason for Services/Other Comments:  Patient continues to require skilled intervention due to   Inability to complete functional mobility and TKA management independently  . Use of outcome tool(s) and clinical judgement create a POC that gives a: Clear prediction of patient's progress: LOW COMPLEXITY            TREATMENT:   (In addition to Assessment/Re-Assessment sessions the following treatments were rendered)     Pre-treatment Symptoms/Complaints:  patient agreeable. Pain Initial:   Pain Intensity 1: 3  Post Session:  3     Therapeutic Activity: (    25 minutes): Therapeutic activities including Chair transfers, Ambulation on level ground, Stairs, exercises as below, and d/c education to improve mobility, strength, balance, and coordination. Required minimal Safety awareness training;Verbal cues to promote static and dynamic balance in standing and promote coordination of right, lower extremity(s).      Assessment     Date:  3/17/22 Date:   Date:     ACTIVITY/EXERCISE AM PM AM PM AM PM     []  []  []  []  []  []   Ankle Pumps  15       Quad Sets  15       Gluteal Sets  15       Hip ABd/ADduction  15       Straight Leg Raises  15       Knee Slides  15       Short Arc Quads  15       Chair Slides  15                         B = bilateral; AA = active assistive; A = active; P = passive      Treatment/Session Assessment:     Response to Treatment:  Patient participated well and moving well. Education:  [x] Home Exercises  [x] Fall Precautions  [x] Use of Cold Therapy Unit [x] D/C Instruction Review  [] Knee Prosthesis Review  [x] Walker Management/Safety [] Adaptive Equipment as Needed  [x] No pillow under knee       Interdisciplinary Collaboration:   Physical Therapist  Registered Nurse    After treatment position/precautions:   Up in chair  Bed/Chair-wheels locked  Caregiver at bedside  Call light within reach  RN notified    Compliance with Program/Exercises: Will assess as treatment progresses. Recommendations/Intent for next treatment session:  Treatment next visit will focus on increasing Mr. Layla Dos Santos independence with bed mobility, transfers, gait training, strength/ROM exercises, modalities for pain, and patient education.       Total Treatment Duration:  PT Patient Time In/Time Out  Time In: 1415  Time Out: Yuly Martinez PT

## 2022-03-17 NOTE — PROGRESS NOTES
Received to room from PACU. Right knee Aquacel clean, dry, and intact. Plantar/dorsiflexion abssent, pulses 2+, numbness and limited movement in bilateral lower extremities. Ice applied to right knee. Discussed diet and pain control. Oriented to room and bed functions. Bed locked, in lowest position, call light within reach.

## 2022-03-17 NOTE — ANESTHESIA PROCEDURE NOTES
Peripheral Block    Start time: 3/17/2022 7:39 AM  End time: 3/17/2022 7:41 AM  Performed by: Ann Dugan MD  Authorized by: Ann Dugan MD       Pre-procedure: Indications: at surgeon's request and post-op pain management    Preanesthetic Checklist: patient identified, risks and benefits discussed, site marked, timeout performed, anesthesia consent given and patient being monitored    Timeout Time: 07:38 EDT          Block Type:   Block Type: Adductor canal  Laterality:  Right  Monitoring:  Standard ASA monitoring, continuous pulse ox, frequent vital sign checks, heart rate, responsive to questions and oxygen  Injection Technique:  Single shot  Procedures: ultrasound guided    Patient Position: supine  Prep: chlorhexidine    Location:  Mid thigh  Needle Type:  Stimuplex  Needle Gauge:  22 G  Needle Localization:  Ultrasound guidance  Medication Injected:  Ropivacaine 0.2% with epinephrine 1:200,000 injection, 20 mL (Mixture components: ropivacaine 2 mg/mL (0.2 %) Soln, 1 mL; EPINEPHrine HCl (PF) 1 mg/mL (1 mL) Soln, . 005 mL)  dexamethasone (DECADRON) 4 mg/mL injection, 4 mg  Med Admin Time: 3/17/2022 7:41 AM    Assessment:  Number of attempts:  1  Injection Assessment:  Incremental injection every 5 mL, local visualized surrounding nerve on ultrasound, negative aspiration for CSF, negative aspiration for blood, no paresthesia, no intravascular symptoms and ultrasound image on chart  Patient tolerance:  Patient tolerated the procedure well with no immediate complications

## 2022-03-17 NOTE — ANESTHESIA PROCEDURE NOTES
Spinal Block    Start time: 3/17/2022 8:24 AM  End time: 3/17/2022 8:26 AM  Performed by: Donna Clements MD  Authorized by: Donna Clements MD     Pre-procedure:   Indications: primary anesthetic  Preanesthetic Checklist: patient identified, risks and benefits discussed, anesthesia consent, patient being monitored and timeout performed    Timeout Time: 08:23 EDT          Spinal Block:   Patient Position:  Seated  Prep Region:  Lumbar  Prep: chlorhexidine and patient draped      Location:  L3-4  Technique:  Single shot    Local Dose (mL):  2    Needle:   Needle Type:  Pencan  Needle Gauge:  25 G  Attempts:  1      Events: CSF confirmed, no blood with aspiration and no paresthesia        Assessment:  Insertion:  Uncomplicated  Patient tolerance:  Patient tolerated the procedure well with no immediate complications

## 2022-03-17 NOTE — PROGRESS NOTES
Problem: Self Care Deficits Care Plan (Adult)  Goal: *Acute Goals and Plan of Care (Insert Text)  Outcome: Progressing Towards Goal  Note: GOALS:   DISCHARGE GOALS (in preparation for going home/rehab):  3 days  1. Mr. Viv Oglesby will perform one lower body dressing activity with stand by assist required to demonstrate improved functional mobility and safety. 2.  Mr. Viv Oglesby will perform one lower body bathing activity with stand by assist required to demonstrate improved functional mobility and safety. 3.  Mr. Viv Oglesby will perform toileting/toilet transfer with stand by assist to demonstrate improved functional mobility and safety. 4.  Mr. Viv Oglesby will perform shower transfer with stand by assist to demonstrate improved functional mobility and safety. JOINT CAMP OCCUPATIONAL THERAPY TKA: Initial Assessment and Daily Note 3/17/2022  OUTPATIENT: Hospital Day: 1  Payor: Liliana Brown / Plan: [place] [employer] [facility] / Product Type: PPO /      NAME/AGE/GENDER: Pasha Cheung is a 49 y.o. male   PRIMARY DIAGNOSIS:  Osteoarthritis of right knee, unspecified osteoarthritis type [M17.11]   Procedure(s) and Anesthesia Type:     * RIGHT KNEE ARTHROPLASTY TOTAL ROBOTIC ASSISTED PRASAD REBECCA *SDD* - Spinal (Right)  ICD-10: Treatment Diagnosis:    Pain in Right Knee (M25.561)  Stiffness of Right Knee, Not elsewhere classified (R84.059)      ASSESSMENT:     Mr. Viv Oglesby is s/p Right TKA and presents with decreased weight bearing on R LE and decreased independence with functional mobility and activities of daily living as compared to baseline level of function and safety. Patient would benefit from skilled Occupational Therapy to maximize independence and safety with self-care task and functional mobility. Pt would also benefit from education on adaptive equipment and safety precautions in preparation for going home.         Patient able to don clothes at edge of bed with assist. Mobilized from bed to recliner using a rolling walker with assist. Should progress well with ADL's tomorrow. Did well enough to return home day of surgery if willing. This section established at most recent assessment   PROBLEM LIST (Impairments causing functional limitations):  Decreased Strength  Decreased ADL/Functional Activities  Decreased Transfer Abilities  Increased Pain  Increased Fatigue  Decreased Flexibility/Joint Mobility  Decreased Knowledge of Precautions   INTERVENTIONS PLANNED: (Benefits and precautions of occupational therapy have been discussed with the patient.)  Activities of daily living training  Adaptive equipment training  Balance training  Clothing management  Donning&doffing training  Theraputic activity     TREATMENT PLAN: Frequency/Duration: Follow patient 1-2tx to address above goals. Rehabilitation Potential For Stated Goals: Good     RECOMMENDED REHABILITATION/EQUIPMENT: (at time of discharge pending progress): Continue Skilled Therapy. OCCUPATIONAL PROFILE AND HISTORY:   History of Present Injury/Illness (Reason for Referral): Pt presents this date s/p (Right) TKA. Past Medical History/Comorbidities:   Mr. Nuria Parsons  has a past medical history of HTN (hypertension), Migraines, and Personal history of COVID-19 (11/2020). Mr. Nuria Parsons  has a past surgical history that includes hx hip replacement (Right, 12/2001); hx cholecystectomy (03/2019); hx colonoscopy (03/2019); and hx endoscopy (03/2019). Social History/Living Environment:   Home Environment: Private residence  # Steps to Enter: 4  Rails to Enter: No  One/Two Story Residence: One story  Living Alone: No  Support Systems: Spouse/Significant Other  Patient Expects to be Discharged to[de-identified] Home with home health  Current DME Used/Available at Home: None  Tub or Shower Type: Tub/Shower combination    Prior Level of Function/Work/Activity:  Independent prior.       Number of Personal Factors/Comorbidities that affect the Plan of Care: Brief history (0): LOW COMPLEXITY   ASSESSMENT OF OCCUPATIONAL PERFORMANCE[de-identified]   Most Recent Physical Functioning:   Balance  Sitting: Intact  Standing: With support                    Coordination  Fine Motor Skills-Upper: Left Intact; Right Intact  Gross Motor Skills-Upper: Left Intact; Right Intact         Mental Status  Neurologic State: Alert  Orientation Level: Oriented X4  Cognition: Appropriate decision making  Perception: Appears intact                Basic ADLs (From Assessment) Complex ADLs (From Assessment)   Basic ADL  Feeding: Independent  Oral Facial Hygiene/Grooming: Stand-by assistance  Bathing: Contact guard assistance  Upper Body Dressing: Setup  Lower Body Dressing: Minimum assistance  Toileting: Contact guard assistance     Grooming/Bathing/Dressing Activities of Daily Living                       Functional Transfers  Bathroom Mobility: Contact guard assistance  Toilet Transfer : Contact guard assistance  Shower Transfer: Minimum assistance     Bed/Mat Mobility  Supine to Sit: Stand-by assistance  Sit to Stand: Contact guard assistance  Stand to Sit: Contact guard assistance  Bed to Chair: Contact guard assistance  Scooting: Stand-by assistance         Physical Skills Involved:  Range of Motion  Balance  Strength  Activity Tolerance Cognitive Skills Affected (resulting in the inability to perform in a timely and safe manner):  WFL Psychosocial Skills Affected:  WFL   Number of elements that affect the Plan of Care: 1-3:  LOW COMPLEXITY   CLINICAL DECISION MAKIN [facility] [address] [address] AM-PAC 6 Clicks   Daily Activity Inpatient Short Form  How much help from another person does the patient currently need. .. Total A Lot A Little None   1. Putting on and taking off regular lower body clothing? [] 1   [x] 2   [] 3   [] 4   2. Bathing (including washing, rinsing, drying)? [] 1   [x] 2   [] 3   [] 4   3. Toileting, which includes using toilet, bedpan or urinal?   [] 1   [x] 2   [] 3   [] 4   4.   Putting on and taking off regular upper body clothing? [] 1   [] 2   [] 3   [x] 4   5. Taking care of personal grooming such as brushing teeth? [] 1   [] 2   [] 3   [x] 4   6. Eating meals? [] 1   [] 2   [] 3   [x] 4   © 2007, Trustees of [facility], under license to [employer]. All rights reserved     Score:  Initial: 18 Most Recent: X (Date: -- )    Interpretation of Tool:  Represents activities that are increasingly more difficult (i.e. Bed mobility, Transfers, Gait). Medical Necessity:     Skilled intervention continues to be required due to Deficits noted above. Reason for Services/Other Comments:  Patient continues to require skilled intervention due to   New TKA  . Use of outcome tool(s) and clinical judgement create a POC that gives a: MODERATE COMPLEXITY            TREATMENT:   (In addition to Assessment/Re-Assessment sessions the following treatments were rendered)     Pre-treatment Symptoms/Complaints:    Pain: Initial:   Pain Intensity 1: 3  Pain Location 1: Knee  Pain Orientation 1: Right  Post Session:  3     Self Care: (25): Procedure(s) (per grid) utilized to improve and/or restore self-care/home management as related to dressing, bathing, toileting, grooming, and transfers . Required minimal verbal and tactile cueing to facilitate activities of daily living skills. Initial evaluation 5 minutes. Treatment/Session Assessment:     Response to Treatment:  Good, sitting up in recliner.     Education:  [] Home Exercises  [x] Fall Precautions  [] Hip Precautions [] Going Home Video  [x] Knee/Hip Prosthesis Review  [x] Walker Management/Safety [x] Adaptive Equipment as Needed       Interdisciplinary Collaboration:   Physical Therapist  Occupational Therapist  Registered Nurse    After treatment position/precautions:   Up in chair  Bed/Chair-wheels locked  Caregiver at bedside  Call light within reach  RN notified     Compliance with Program/Exercises: Compliant all of the time, Will assess as treatment progresses. Recommendations/Intent for next treatment session:  Treatment next visit will focus on increasing Mr. Sundeep Hines independence with bed mobility, transfers, self care, functional mobility, modalities for pain, and patient education.       Total Treatment Duration:  OT Patient Time In/Time Out  Time In: 1330  Time Out: [address] [place], [place]

## 2022-03-17 NOTE — OP NOTES
[address] [place] [address] Robotic Assisted Total Knee Arthroplasty: Posterior Cruciate Retaining       Patient:Charlie Ace   : 1965  Medical Record Number:[id number]      Pre-operative Diagnosis:  Osteoarthritis of right knee, unspecified osteoarthritis type [M17.11]  Post-operative Diagnosis: Osteoarthritis of right knee, unspecified osteoarthritis type [M17.11]  Location: [place] [address]    Date of Procedure: 3/17/2022  Surgeon: Aracelis Trammell MD  Assistant:  Indira Thomas. FELICITY Villanueva    Anesthesia: Spinal and  nerve block  BMI:Body mass index is 24.12 kg/m². CPT- 21552- Total knee arthroplasty           39763- Other procedures on musculoskeletal system            0055T- Computer assisted surgical navigation     Procedure:  Right Cementless Cruciate Retained Total Knee Arthroplasty     Tourniquet Time: none    EBL:  150 cc         The complexity of the total joint surgery requires the use of a first assistant for positioning, retraction and assistance in closure. Marissa Betancur was brought to the operating room and positioned on the operating table. He was anesthestized with anesthesia. IV antibiotics was administered. Prior to the incision being made a timeout was called identifying the patient, procedure ,operative side and surgeon The operative leg was prepped and draped in the usual sterile manner. An anterior longitudinal incision was accomplished just medial to the tibial tubercle and extending approximal 6 centimeters proximal to the superior pole of the patella over the right knee. A medial parapatellar capsular incision was performed. The medial capsular flap was elevated around to the insertion of the semimembranous tendon. The patella was everted and the knee flexed and externally rotated. The medial and external menisci were excised. The lateral half of the fat pad excised and the patella femoral ligament was released.   The anterior cruciate ligament was resect and the posterior cruciate ligament was retained. The femoral and tibial arrays were pinned in place and registered with the Civo 92. The patient landmarks were collected and the tibial and femoral checkpoints were registered and verified. The preresection balancing was performed. The osteophytes were then removed as exposed on the femoral and tibial surfaces. Utilizing the [facility] robotic arm the femoral and tibial cuts were accomplished. The tibia was sized. The tibial base plate was pinned into place with the appropriate external rotation and stem site prepared. A trial femoral component and poly was placed. A preliminary range of motion was accomplished with the trial components. The patient was found to obtain full extension as well as appropriate flexion. The patient's ligaments were stable in flexion and extension to medial and lateral stressing and the alignment was through the appropriate mechanical axis. Additional surgical releases were none. The patella was then everted. Osteophytes were removed. The patella articular surface was inspected with no wear was deemed acceptable to not resurface. All trial components were removed. The real implants were opened: Sizes selected were a size 5 femoral, 6 tibial, and a 9 mm polyethylene insert. The knee was irrigated. The real components were impacted into place. Real [place] knee was placed through range of motion and noted to be stable as mentioned above with the trial components. The operative knee was injected with 60 cc of Naropin, 10 cc's of morphine and 1 cc of 30 mg of Toradol. The knee was then soaked with Irracept for approximately 3 min. This was then thoroughly irrigated. The capsular layer was closed using a #1 PDS suture and a #1 Vicryl. Then, 1 gram (100 mg/ml) of Transexamic Acid and 1 gram of Vancomycin was injected into the joint space.  The subcutaneous layers were closed using 2-0 vicryl. The skin was closed using staples which were applied in occlusive fashion and sterile bandage applied. An Iceman cryo pad was applied on the operative leg. Sponge count and needle counts were correct. Hettie Record left the operating room     Implants:   Implant Name Type Inv.  Item Serial No.  Lot No. LRB No. Used Action   BASEPLATE TIB SZ 6 [id number] ML77MM KNEE TRITANIUM 4 CRUCFRM - [id number]  BASEPLATE TIB SZ 6 [id number] ML77MM KNEE TRITANIUM 4 CRUCFRM [id number] [employer] HOW_WD [id number] Right 1 Implanted   COMPNT FEM CR TRIATHLN 5 R PA --  - [id number]  COMPNT FEM CR TRIATHLN 5 R PA --  [id number] [employer] HOW[employer]WD [id number] Right 1 Implanted   INSERT TIB SZ 6 THK9MM UNIV KNEE POLYETH CNDYL STBL PATRIA NEUT - [id number]  INSERT TIB SZ 6 THK9MM UNIV KNEE POLYETH CNDYL STBL PATRIA NEUT [id number] [employer] HOW_WD [id number] Right 1 Implanted         Signed By: Urszula Connelly MD   3/17/2022,  10:10 AM

## 2022-03-17 NOTE — H&P
The patient has end stage arthritis of the right knee joint. The patient was seen and examined and there are no changes to the patient's orthopedic condition. They have tried conservative treatment for this condition; including antiinflammatories and lifestyle modifications and have failed. The necessity for the joint replacement is still present, and the H&P from the office is still current. The patient will be admitted today forProcedure(s) (LRB):  RIGHT KNEE ARTHROPLASTY TOTAL ROBOTIC ASSISTED PRASAD FERNANDEZ *RANDOLPH* (Right) .

## 2022-03-17 NOTE — ANESTHESIA PREPROCEDURE EVALUATION
Anesthetic History   No history of anesthetic complications            Review of Systems / Medical History  Patient summary reviewed and pertinent labs reviewed    Pulmonary  Within defined limits                 Neuro/Psych         Headaches (On Propranolol for Migraines)     Cardiovascular    Hypertension: well controlled              Exercise tolerance: >4 METS     GI/Hepatic/Renal  Within defined limits              Endo/Other        Arthritis     Other Findings              Physical Exam    Airway  Mallampati: I  TM Distance: > 6 cm  Neck ROM: normal range of motion   Mouth opening: Normal     Cardiovascular  Regular rate and rhythm,  S1 and S2 normal,  no murmur, click, rub, or gallop             Dental  No notable dental hx       Pulmonary  Breath sounds clear to auscultation               Abdominal  GI exam deferred       Other Findings            Anesthetic Plan    ASA: 2  Anesthesia type: spinal            Anesthetic plan and risks discussed with: Patient and Spouse

## 2022-03-17 NOTE — DISCHARGE INSTRUCTIONS
[facility]   Patient Discharge Instructions    Morgan Beatty / [id number] : 1965    Admitted 3/17/2022 Discharged: 3/17/2022     IF YOU HAVE ANY PROBLEMS ONCE YOU ARE AT HOME CALL THE FOLLOWING NUMBERS:   Main office number: [phone]    Take Home Medications         · It is important that you take the medication exactly as they are prescribed. · Keep your medication in the bottles provided by the pharmacist and keep a list of the medication names, dosages, and times to be taken in your wallet. · Do not take other medications without consulting your doctor. What to do at [address] your prehospital diet. If you have excessive nausea or vomitting call your doctor's office     Home Physical Therapy is arranged. Use rolling walker when walking. Patients who have had a joint replacement should not drive until you are seen for your follow up appointment by Dr. Ofelia Crawford. When to Call    - Call if you have a temperature greater then 101  - Unable to keep food down  - Loose control of your bladder or bowel function  - Are unable to bear any weight   - Need a pain medication refill     Patient Education        Total Knee Replacement: What to Expect at  [address] had a total knee replacement. The doctor replaced the worn ends of the bones that connect to your knee (thighbone and lower leg bone) with plastic and metal parts. When you leave the hospital, you should be able to move around with a walker or crutches. But you will need someone to help you at home until you have more energy and can move around better. You will go home with a bandage and stitches, staples, skin glue, or tape strips. Change the bandage as your doctor tells you to. If you have stitches or staples, your doctor will remove them about 2 weeks after your surgery. Glue or tape strips will fall off on their own over time.  You may still have some mild pain, and the area may be swollen for a few months after surgery. Your knee will continue to improve for up to a year. You will probably use a walker for some time after surgery. When you are ready, you can use a cane. You may be able to walk without support after a couple weeks, or when you are comfortable. You will need to do months of physical rehabilitation (rehab) after a knee replacement. Rehab will help you strengthen the muscles of the knee and help you regain movement. After you recover, your artificial knee will allow you to do normal daily activities with less pain or no pain at all. You may be able to hike, dance, or ride a bike. Talk to your doctor about whether you can do more strenuous activities. Always tell your caregivers that you have an artificial knee. How long it will take to walk on your own, return to normal activities, and go back to work depends on your health and how well your rehabilitation (rehab) program goes. The better you do with your rehab exercises, the quicker you will get your strength and movement back. This care sheet gives you a general idea about how long it will take for you to recover. But each person recovers at a different pace. Follow the steps below to get better as quickly as possible. How can you care for yourself at home? Activity    · Rest when you feel tired. You may take a nap, but don't stay in bed all day. When you sit, use a chair with arms. You can use the arms to help you stand up.     · Work with your physical therapist to find the best way to exercise. What you can do as your knee heals will depend on whether your new knee is cemented or uncemented. You may not be able to do certain things for a while if your new knee is uncemented.     · After your knee has healed enough, you can do more strenuous activities with caution. ? You can golf, but you may want to use a golf cart for some time. And don't wear shoes with spikes. ? You can bike on a flat road or on a stationary bike.  Talk to your doctor before biking uphill. ? Your doctor may suggest that you stay away from activities that put stress on your knee. These include tennis, badminton, contact sports like football, jumping (such as in basketball), jogging, and running. ? Avoid activities where you might fall.     · Do not sit for more than 1 hour at a time. Get up and walk around for a while before you sit again. If you must sit for a long time, prop up your leg with a chair or footstool. This will help you avoid swelling.     · Ask your doctor when you can drive again. It may take several weeks after knee replacement surgery before it's safe for you to drive.     · When you get into a car, sit on the edge of the seat. Then pull in your legs, and turn to face the front.     · You should be able to do many everyday activities 3 to 6 weeks after your surgery. You will probably need to take 4 to 16 weeks off from work. When you can go back to work depends on the type of work you do and how you feel.     · Ask your doctor when it is okay for you to have sex.     · For 12 weeks, do not lift anything heavier than 10 pounds and do not lift weights. Diet    · By the time you leave the hospital, you should be eating your normal diet. If your stomach is upset, try bland, low-fat foods like plain rice, broiled chicken, toast, and yogurt. Your doctor may suggest that you take iron and vitamin supplements.     · Drink plenty of fluids (unless your doctor tells you not to).   · Eat healthy foods, and watch your portion sizes. Try to stay at your ideal weight. Too much weight puts more stress on your new knee.     · You may notice that your bowel movements are not regular right after your surgery. This is common. Try to avoid constipation and straining with bowel movements. Drinking enough fluids, taking a stool softener, and eating foods that are good sources of fiber can help you avoid constipation.  If you have not had a bowel movement after a couple of days, talk to your doctor. Medicines    · Your doctor will tell you if and when you can restart your medicines. You will also get instructions about taking any new medicines.     · If you take aspirin or some other blood thinner, ask your doctor if and when to start taking it again. Make sure that you understand exactly what your doctor wants you to do.     · Your doctor may give you a blood-thinning medicine to prevent blood clots. If you take a blood thinner, be sure you get instructions about how to take your medicine safely. Blood thinners can cause serious bleeding problems. This medicine could be in pill form or as a shot (injection). If a shot is needed, your doctor will tell you how to do this.     · Be safe with medicines. Take pain medicines exactly as directed. ? If the doctor gave you a prescription medicine for pain, take it as prescribed. ? If you are not taking a prescription pain medicine, ask your doctor if you can take an over-the-counter medicine. ? Plan to take your pain medicine 30 minutes before exercises. It is easier to prevent pain before it starts than to stop it after it has started.     · If you think your pain medicine is making you sick to your stomach:  ? Take your medicine after meals (unless your doctor has told you not to). ? Ask your doctor for a different pain medicine.     · If your doctor prescribed antibiotics, take them as directed. Do not stop taking them just because you feel better. You need to take the full course of antibiotics. Incision care    · If your doctor told you how to care for your cut (incision), follow your doctor's instructions. You will have a dressing over the cut. A dressing helps the incision heal and protects it. Your doctor will tell you how to take care of this.     · If you did not get instructions, follow this general advice:  ?  If you have strips of tape on the cut the doctor made, leave the tape on for a week or until it falls off.  ? If you have stitches or staples, your doctor will tell you when to come back to have them removed. ? If you have skin glue on the cut, leave it on until it falls off. Skin glue is also called skin adhesive or liquid stitches. ? Change the bandage every day. ? Wash the area daily with warm water, and pat it dry. Don't use hydrogen peroxide or alcohol. They can slow healing. ? You may cover the area with a gauze bandage if it oozes fluid or rubs against clothing. ? You may shower 24 to 48 hours after surgery. Pat the incision dry. Don't swim or take a bath for the first 2 weeks, or until your doctor tells you it is okay. Exercise    · Your rehab program will give you a number of exercises to do to help you get back your knee's range of motion and strength. Always do them as your therapist tells you. Ice    · For pain and swelling, put ice or a cold pack on the area for 10 to 20 minutes at a time. Put a thin cloth between the ice and your skin. If your doctor recommended cold therapy using a portable machine, follow the instructions that came with the machine. Other instructions    · Wear compression stockings if your doctor told you to. These may help to prevent blood clots. Your doctor will tell you how long you need to keep wearing the compression stockings.     · Carry a medical alert card that says you have an artificial joint. You have metal pieces in your knee. These may set off some airport metal detectors. Follow-up care is a key part of your treatment and safety. Be sure to make and go to all appointments, and call your doctor if you are having problems. It's also a good idea to know your test results and keep a list of the medicines you take. When should you call for help? Call 911 anytime you think you may need emergency care.  For example, call if:    · You passed out (lost consciousness).     · You have severe trouble breathing.     · You have sudden chest pain and shortness of breath, or you cough up blood. Call your doctor now or seek immediate medical care if:    · You have signs of infection, such as:  ? Increased pain, swelling, warmth, or redness. ? Red streaks leading from the incision. ? Pus draining from the incision. ? A fever.     · You have signs of a blood clot, such as:  ? Pain in your calf, back of the knee, thigh, or groin. ? Redness and swelling in your leg or groin.     · Your incision comes open and begins to bleed, or the bleeding increases.     · You have pain that does not get better after you take pain medicine. Watch closely for changes in your health, and be sure to contact your doctor if:    · You do not have a bowel movement after taking a laxative. Where can you learn more? Go to http://www.gray.com/  Enter T054 in the search box to learn more about \"Total Knee Replacement: What to Expect at Home. \"  Current as of: July 1, 2021               Content Version: 13.2  © 2006-2022 [employer]. Care instructions adapted under license by [employer] (which disclaims liability or warranty for this information). If you have questions about a medical condition or this instruction, always ask your healthcare professional. [place] [address] any warranty or liability for your use of this information. Information obtained by :  I understand that if any problems occur once I am at home I am to contact my physician. I understand and acknowledge receipt of the instructions indicated above.                                                                                                                                            Physician's or R.N.'s Signature                                                                  Date/Time                                                                                                                                              Patient or Representative Signature Date/Time

## 2022-03-17 NOTE — PROGRESS NOTES
TRANSFER - IN REPORT:    Verbal report received from Shirley, [facility] on Leopoldo Caffey  being received from PACU for routine post - op      Report consisted of patients Situation, Background, Assessment and   Recommendations(SBAR). Information from the following report(s) SBAR, Kardex, OR Summary, Procedure Summary, Intake/Output and MAR was reviewed with the receiving nurse. Opportunity for questions and clarification was provided. Assessment completed upon patients arrival to unit and care assumed.

## 2022-03-17 NOTE — ANESTHESIA POSTPROCEDURE EVALUATION
Procedure(s):  RIGHT KNEE ARTHROPLASTY TOTAL ROBOTIC ASSISTED PRASAD REBECCA *SDD*.     spinal    Anesthesia Post Evaluation      Multimodal analgesia: multimodal analgesia used between 6 hours prior to anesthesia start to PACU discharge  Patient location during evaluation: bedside  Patient participation: complete - patient participated  Level of consciousness: awake  Pain management: adequate  Airway patency: patent  Anesthetic complications: no  Cardiovascular status: acceptable  Respiratory status: spontaneous ventilation and acceptable  Hydration status: acceptable  Post anesthesia nausea and vomiting:  none      INITIAL Post-op Vital signs:   Vitals Value Taken Time   /71 03/17/22 1054   Temp 36.5 °C (97.7 °F) 03/17/22 1051   Pulse 67 03/17/22 1054   Resp 16 03/17/22 1054   SpO2 94 % 03/17/22 1051

## 2022-03-19 PROBLEM — R06.83 SNORING: Status: ACTIVE | Noted: 2022-02-28

## 2022-03-24 PROBLEM — M17.11 ARTHRITIS OF RIGHT KNEE: Status: ACTIVE | Noted: 2022-03-17

## 2022-04-04 ENCOUNTER — NURSE NAVIGATOR (OUTPATIENT)
Dept: PHYSICAL THERAPY | Age: 57
End: 2022-04-04

## 2022-04-04 DIAGNOSIS — Z96.651 S/P TOTAL KNEE ARTHROPLASTY, RIGHT: Primary | ICD-10-CM

## 2022-04-11 ENCOUNTER — HOSPITAL ENCOUNTER (OUTPATIENT)
Dept: PHYSICAL THERAPY | Age: 57
Discharge: HOME | End: 2022-04-11
Payer: COMMERCIAL

## 2022-04-11 DIAGNOSIS — Z96.651 S/P TOTAL KNEE ARTHROPLASTY, RIGHT: ICD-10-CM

## 2022-04-11 PROCEDURE — 97161 PT EVAL LOW COMPLEX 20 MIN: CPT

## 2022-04-11 PROCEDURE — 97110 THERAPEUTIC EXERCISES: CPT
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
## 2022-04-11 NOTE — PROGRESS NOTES
Judy White  : 1965  Payor: Alisia Cool / [place]: [place] OF [address] [place] [address] Rd / Product Type: PPO /  [id number] at [address] [place] [address]. [address] [place] [address] [address], [id number] [place] [address] Road  [phone]   [phone]                                                          Lacy Sorensen      OUTPATIENT PHYSICAL THERAPY: Daily Treatment Note 2022 Visit Count:  1    ICD-10: Treatment Diagnosis:  Pain in right knee (M25.561)  Stiffness of right knee, not elsewhere classified (M25.661)  Presence of right artificial knee joint (F18.769)  Other abnormalities of gait and mobility (R26.89)  Generalized Muscle Weakness (M62.81)              Precautions/Allergies:   Penicillins, Oxycodone-acetaminophen, and Erythromycin   Fall Risk Score: 2 (? 5 = High Risk)  MD Orders: Eval and Treat  MEDICAL/REFERRING DIAGNOSIS:  S/P total knee arthroplasty, right [Z96.651]   DATE OF ONSET: DOS 3/17/2022  REFERRING PHYSICIAN: Lacy Sorensen,*  RETURN PHYSICIAN APPOINTMENT: 2022         * RIGHT KNEE ARTHROPLASTY TOTAL ROBOTIC ASSISTED PRASAD REBECCA *SDD* - Spinal (Right)               Pre-treatment Symptoms/Complaints: See Initial Eval Dated 2022 for more details. Pain: Initial:3/10  Medications Last Reviewed:  2022     Post Session: 2/10   Updated Objective Findings: See Initial Eval for more details. TREATMENT:   THERAPEUTIC EXERCISE: (23 minutes):  Exercises per grid below to improve mobility, strength and balance. Required minimal visual, verbal and manual cues to promote proper body alignment and promote proper body posture. Progressed resistance and complexity of movement as indicated.      Date:  2022 Date:   Date:     Activity/Exercise Parameters Parameters Parameters   Education HEP, POC, PT goals, anatomy/pathology     TM      Nustep      Calf stretch [id number]     Hamstring stretch [id number]           IT Band stretch 8h22nqx     Quad set [id number] SLR 10x     Heel slide [id number]     LAQ 15x           THERAPEUTIC ACTIVITY: ( 0 minutes): Activities per gid below to improve functional movement related mobility, strength and balance to improve neuro-muscular carryover to daily functional activities for improving patient's quality of life. Required visual, verbal and manual cues to promote proper body alignment and promote proper body posture/mechanics. Progressed resistance and complexity of movement as indicated. Date:  4/11/2022 Date:   Date:     Activity/Exercise Parameters Parameters Parameters                                                                               MANUAL THERAPY: (0 minutes): Joint mobilization, Soft tissue mobilization was utilized and necessary because of the patient's restricted joint motion and restricted motion of soft tissue mobility. Date  4/11/2022    Technique Used Grade  Level # Time(s) Effect while being performed          Patella 4 way II III             PROM              Tissue Mobilization                           HEP Log Date 1.    4/11/2022   2.  4/11/2022   3. 4/11/2022   4.    5.           [facility] Portal  Treatment/Session Summary:    Response to Treatment: Pt demonstrated understanding of POC and initial HEP. No increase in pain or adverse reactions. Communication/Consultation:  POC, HEP, PT goals, Faxed initial evaluation to MD.   Equipment provided today: HEP Handout   Recommendations/Intent for next treatment session:   Next visit will focus on Manual Therapy Core Stability Pain Science Education Quad strengthening Hip strengthening soft tissue mobilization. Treatment Plan of Care Effective Dates: 4/11/2022 TO 7/10/2022 (60 days).   Frequency/Duration: 2 times a week for 90 Days             Total Treatment Billable Duration:   23  Rx plus Eval   PT Patient Time In/Time Out  Time In: 1015  Time Out: 0737  [facility],     Future Appointments   Date Time Provider Ritesh Chi   4/11/2022 10:15 AM Nino Hawk PT [facility]   4/11/2022  1:40 PM MD [facility]   5/20/2022  8:30 AM MD [facility]

## 2022-04-15 ENCOUNTER — HOSPITAL ENCOUNTER (OUTPATIENT)
Dept: PHYSICAL THERAPY | Age: 57
Discharge: HOME | End: 2022-04-15
Payer: COMMERCIAL

## 2022-04-15 PROCEDURE — 97110 THERAPEUTIC EXERCISES: CPT

## 2022-04-15 NOTE — PROGRESS NOTES
Kelsey Espinal  : 1965  Payor: Mercy Sloan / [place] OF [address] [place] [address] Rd / Product Type: PPO /  Glorya Reveal at [facility]. [facility], [address], [address] [place] [address]  [phone]   [phone]                                                          Conchita Goodwin      OUTPATIENT PHYSICAL THERAPY: Daily Treatment Note 4/15/2022 Visit Count:  2    ICD-10: Treatment Diagnosis:  Pain in right knee (M25.561)  Stiffness of right knee, not elsewhere classified (M25.661)  Presence of right artificial knee joint (N68.015)  Other abnormalities of gait and mobility (R26.89)  Generalized Muscle Weakness (M62.81)              Precautions/Allergies:   Penicillins, Oxycodone-acetaminophen, and Erythromycin   Fall Risk Score: 2 (? 5 = High Risk)  MD Orders: Eval and Treat  MEDICAL/REFERRING DIAGNOSIS:  S/P total knee arthroplasty, right [Z96.651]   DATE OF ONSET: DOS 3/17/2022  REFERRING PHYSICIAN: Conchita Goodwin,*  RETURN PHYSICIAN APPOINTMENT: 2022         * RIGHT KNEE ARTHROPLASTY TOTAL ROBOTIC ASSISTED PRASAD REBECCA *SDD* - Spinal (Right)               Pre-treatment Symptoms/Complaints: Patient doing good with walking, just trouble sleeping at night   Pain: Initial:2/10  Medications Last Reviewed:  4/15/2022     Post Session: 2/10   Updated Objective Findings: AROM Knee flex 120 deg PROM 125 deg        TREATMENT:   THERAPEUTIC EXERCISE: (40 minutes):  Exercises per grid below to improve mobility, strength and balance. Required minimal visual, verbal and manual cues to promote proper body alignment and promote proper body posture. Progressed resistance and complexity of movement as indicated.      Date:  2022 Date:  4/15/2022 Date:     Activity/Exercise Parameters Parameters Parameters   Education HEP, POC, PT goals, anatomy/pathology     TM      Nustep  8min L3    L stretch  8h67llu    Calf stretch 1v09qxx 1x81ddp    Hamstring stretch 1k56jiv     Calf Raise  3x10    IT Band stretch 3y50kcq     SB knee flexion  30x    SB LE extension  30x    Quad set 42r9ady [id number]    SLR 10x 15x combo SAQ    Heel slide [id number]    LAQ 15x 2x10 1#    Lateral walk  3x20 no band    Monster walk  3x20 no band    TKE      Cable Pull      Sit to Stand                        THERAPEUTIC ACTIVITY: ( 0 minutes): Activities per gid below to improve functional movement related mobility, strength and balance to improve neuro-muscular carryover to daily functional activities for improving patient's quality of life. Required visual, verbal and manual cues to promote proper body alignment and promote proper body posture/mechanics. Progressed resistance and complexity of movement as indicated. Date:  4/15/2022 Date:   Date:     Activity/Exercise Parameters Parameters Parameters                                                                               MANUAL THERAPY: (0 minutes): Joint mobilization, Soft tissue mobilization was utilized and necessary because of the patient's restricted joint motion and restricted motion of soft tissue mobility. Date  4/15/2022    Technique Used Grade  Level # Time(s) Effect while being performed          Patella 4 way II III             PROM              Tissue Mobilization                           HEP Log Date 1.    4/15/2022   2.  4/15/2022   3. 4/15/2022   4.    5.           [employer] Portal  Treatment/Session Summary:    Response to Treatment: Pt demonstrated understanding of POC and initial HEP. No increase in pain or adverse reactions. Communication/Consultation:  POC, HEP, Gait, Stretching   Equipment provided today: Not Today   Recommendations/Intent for next treatment session:   Next visit will focus on Manual Therapy Core Stability Pain Science Education Quad strengthening Hip strengthening soft tissue mobilization.          Treatment Plan of Care Effective Dates: 4/11/2022 TO 7/10/2022 (60 days).   Frequency/Duration: 2 times a week for 90 Days             Total Treatment Billable Duration:   40  Rx   PT Patient Time In/Time Out  Time In: 1110  Time Out: 98 [facility],     Future Appointments   Date Time Provider Ritesh Duran   4/15/2022 11:15 AM Abdulaziz Byers, PT [facility]   4/19/2022  9:30 AM Abdulaziz Byers, PT [facility]   4/27/2022  1:45 PM Abdulaziz Byers, PT [facility]   4/29/2022  9:00 AM Abdulaziz Byers, PT [facility]   5/20/2022  8:30 AM Conchita Goodwin MD [facility]   9/12/2022  8:30 AM Conchita Goodwin MD [facility]

## 2022-04-19 ENCOUNTER — HOSPITAL ENCOUNTER (OUTPATIENT)
Dept: PHYSICAL THERAPY | Age: 57
Discharge: HOME | End: 2022-04-19
Payer: COMMERCIAL

## 2022-04-19 PROCEDURE — 97110 THERAPEUTIC EXERCISES: CPT

## 2022-04-19 NOTE — PROGRESS NOTES
Severino Johnson  : 1965  Payor: Bruno Dang / [place]: [place] OF Quintin Custard / Product Type: PPO /  [address] at [address]. [facility], [address], [address]  [phone]   [phone]                                                          Sushil Jaramillo      OUTPATIENT PHYSICAL THERAPY: Daily Treatment Note 2022 Visit Count:  3    ICD-10: Treatment Diagnosis:  Pain in right knee (M25.561)  Stiffness of right knee, not elsewhere classified (M25.661)  Presence of right artificial knee joint (E01.414)  Other abnormalities of gait and mobility (R26.89)  Generalized Muscle Weakness (M62.81)              Precautions/Allergies:   Penicillins, Oxycodone-acetaminophen, and Erythromycin   Fall Risk Score: 2 (? 5 = High Risk)  MD Orders: Eval and Treat  MEDICAL/REFERRING DIAGNOSIS:  S/P total knee arthroplasty, right [Z96.651]   DATE OF ONSET: DOS 3/17/2022  REFERRING PHYSICIAN: Sushil Jaramillo,*  RETURN PHYSICIAN APPOINTMENT: 2022         * RIGHT KNEE ARTHROPLASTY TOTAL ROBOTIC ASSISTED PRASAD REBECCA *SDD* - Spinal (Right)               Pre-treatment Symptoms/Complaints: Patient reports being a little sore from activity yesterday   Pain: Initial:2/10  Medications Last Reviewed:  2022     Post Session: 2/10   Updated Objective Findings: AROM Right Knee 3-128        TREATMENT:   THERAPEUTIC EXERCISE: (42 minutes):  Exercises per grid below to improve mobility, strength and balance. Required minimal visual, verbal and manual cues to promote proper body alignment and promote proper body posture. Progressed resistance and complexity of movement as indicated.      Date:  2022 Date:  4/15/2022 Date:  2022   Activity/Exercise Parameters Parameters Parameters   Education HEP, POC, PT goals, anatomy/pathology     TM      Nustep  8min L3 8min L6 [place] 33 papi   L stretch  3k13fvn 7o02gsq   Calf stretch 8r05moh 2b90bmm 5o25hcg Hamstring stretch 1n47vfc  2c40brm seated   Calf Raise  3x10 3x10   IT Band stretch 2d25tvb     SB knee flexion  30x    SB LE extension  30x    Quad set 03z2erj 39k8ysu    SLR 10x 15x combo SAQ    Heel slide 57l13sev 48f81uqc 08x99vra   LAQ 15x 2x10 1#    Lateral walk  3x20' no band 3x20' green   Monster walk  3x20' no band 3x20' green   Sit to stand  2x10    TKE      Cable Pull   10x  20#   Sit to Stand   2x10   Shuttle press   Bilateral 3x10 75#  R 2x10 12.5               THERAPEUTIC ACTIVITY: ( 0 minutes): Activities per gid below to improve functional movement related mobility, strength and balance to improve neuro-muscular carryover to daily functional activities for improving patient's quality of life. Required visual, verbal and manual cues to promote proper body alignment and promote proper body posture/mechanics. Progressed resistance and complexity of movement as indicated. Date:  4/19/2022 Date:   Date:     Activity/Exercise Parameters Parameters Parameters                                                                               MANUAL THERAPY: (0 minutes): Joint mobilization, Soft tissue mobilization was utilized and necessary because of the patient's restricted joint motion and restricted motion of soft tissue mobility.         Date  4/19/2022    Technique Used Grade  Level # Time(s) Effect while being performed          Patella 4 way II III             PROM              Tissue Mobilization                           HEP Log Date 1.    4/19/2022   2.  4/19/2022   3. 4/19/2022   4.    5.           [employer] Portal  Treatment/Session Summary:    Response to Treatment: Patient had good tolerance to increased closed chain activity, ROM progressing well   Communication/Consultation:  POC, HEP, Gait, Stretching   Equipment provided today: Not Today   Recommendations/Intent for next treatment session:   Next visit will focus on Manual Therapy Core Stability Pain Science Education Quad strengthening Hip strengthening soft tissue mobilization. Treatment Plan of Care Effective Dates: 4/11/2022 TO 7/10/2022 (60 days).   Frequency/Duration: 2 times a week for 90 Days             Total Treatment Billable Duration:   42  Rx   PT Patient Time In/Time Out  Time In: 0930  Time Out: [address], PT    Future Appointments   Date Time Provider Ritesh Duran   4/27/2022  1:45 PM Edwar Reeves, PT [facility] AND [place] [facility]   4/29/2022  9:00 AM Mertie Reeves, PT [facility]   5/3/2022 11:15 AM Mertie Reeves, PT [facility]   5/5/2022  9:30 AM Mertie Reeves, PT [facility]   5/9/2022  9:30 AM Mertie Reeves, PT [facility]   5/11/2022  9:00 AM Mertie Reeves, PT [facility]   5/17/2022  9:30 AM Mertie Reeves, PT [facility]   5/19/2022  9:30 AM Mertie Reeves, PT [facility]   5/20/2022  8:30 AM Antonia Walker MD [facility] POA   9/12/2022  8:30 AM Antonia Walker MD [facility] TERRANCE

## 2022-04-27 ENCOUNTER — HOSPITAL ENCOUNTER (OUTPATIENT)
Dept: PHYSICAL THERAPY | Age: 57
Discharge: HOME | End: 2022-04-27
Payer: COMMERCIAL

## 2022-04-27 PROCEDURE — 97110 THERAPEUTIC EXERCISES: CPT

## 2022-04-27 NOTE — PROGRESS NOTES
Charlie Sevilla  : 1965  Payor: Guanaco Hyman / Plan: [place] [address] / Product Type: PPO /  [address] [facility] at [address] [place] [address]. [facility], [address], [address] [place] [address]  [phone]   [phone]                                                          Cha Chan      OUTPATIENT PHYSICAL THERAPY: Daily Treatment Note 2022 Visit Count:  4    ICD-10: Treatment Diagnosis:  Pain in right knee (M25.561)  Stiffness of right knee, not elsewhere classified (M25.661)  Presence of right artificial knee joint (S18.282)  Other abnormalities of gait and mobility (R26.89)  Generalized Muscle Weakness (M62.81)              Precautions/Allergies:   Penicillins, Oxycodone-acetaminophen, and Erythromycin   Fall Risk Score: 2 (? 5 = High Risk)  MD Orders: Eval and Treat  MEDICAL/REFERRING DIAGNOSIS:  S/P total knee arthroplasty, right [Z96.651]   DATE OF ONSET: DOS 3/17/2022  REFERRING PHYSICIAN: Cha Chan,*  RETURN PHYSICIAN APPOINTMENT: 2022         * RIGHT KNEE ARTHROPLASTY TOTAL ROBOTIC ASSISTED PRASAD REBECCA *SDD* - Spinal (Right)               Pre-treatment Symptoms/Complaints: Patient reports working from home, no issues   Pain: Initial:2/10  Medications Last Reviewed:  2022     Post Session: 2/10   Updated Objective Findings: AROM Right Knee 3-128        TREATMENT:   THERAPEUTIC EXERCISE: (42 minutes):  Exercises per grid below to improve mobility, strength and balance. Required minimal visual, verbal and manual cues to promote proper body alignment and promote proper body posture. Progressed resistance and complexity of movement as indicated.      Date:  2022 Date:  4/15/2022 Date:  2022 Date:  2022   Activity/Exercise Parameters Parameters Parameters    Education HEP, POC, PT goals, anatomy/pathology      TM       Nustep  8min L3 8min L6 [place] 33 papi 8min L6 [place] 33 papi   L stretch  1r26egm 6a63lmg 4a61irh Calf stretch 7a56hho 4v84wih 3m24qlq 9b46tro   Hamstring stretch 4o94skf  8v21lsv seated    Calf Raise  3x10 3x10 Left 2x10   IT Band stretch 1k88zee      SB knee flexion  30x     SB LE extension  30x     Quad set 43e5suc 41t8wlj     SLR 10x 15x combo SAQ     Heel slide 45h46ccw 08a04wob 95g11cdw    LAQ 15x 2x10 1#     Lateral walk  3x20' no band 3x20' green 3x20' pink   Monster walk  3x20' no band 3x20' green 3x20' pink   Sit to stand  2x10     TKE       Cable Pull   10x  20#    Sled    3 laps 75#   Sit to Stand   2x10    Shuttle press   Bilateral 3x10 75#  R 2x10 12.5 R 2x10 25#  Bilateral 3x10 100#   Squat    TRX 2x5   RDL                    THERAPEUTIC ACTIVITY: ( 0 minutes): Activities per gid below to improve functional movement related mobility, strength and balance to improve neuro-muscular carryover to daily functional activities for improving patient's quality of life. Required visual, verbal and manual cues to promote proper body alignment and promote proper body posture/mechanics. Progressed resistance and complexity of movement as indicated. Date:  4/27/2022 Date:   Date:     Activity/Exercise Parameters Parameters Parameters                                                                               MANUAL THERAPY: (0 minutes): Joint mobilization, Soft tissue mobilization was utilized and necessary because of the patient's restricted joint motion and restricted motion of soft tissue mobility. Date  4/27/2022    Technique Used Grade  Level # Time(s) Effect while being performed          Patella 4 way II III             PROM              Tissue Mobilization                           HEP Log Date 1.    4/27/2022   2.  4/27/2022   3. 4/27/2022   4.    5.           [employer] Portal  Treatment/Session Summary:    Response to Treatment: Patient had good improvement with right LE leg press, no reports of pain.    Communication/Consultation:  POC, HEP, Gait, Stretching Equipment provided today: Not Today   Recommendations/Intent for next treatment session:   Next visit will focus on Manual Therapy Core Stability Pain Science Education Quad strengthening Hip strengthening soft tissue mobilization. Treatment Plan of Care Effective Dates: 4/11/2022 TO 7/10/2022 (60 days).   Frequency/Duration: 2 times a week for 90 Days             Total Treatment Billable Duration:   42  Rx   PT Patient Time In/Time Out  Time In: 1345  Time Out: [address] PT    Future Appointments   Date Time Provider Ritesh Duran   4/29/2022  9:00 AM [place] Hernandez, PT [facility] [place] [facility]   5/3/2022 11:15 AM [place] Ferraris, PT [facility]   5/5/2022  9:30 AM [place] Ferraris, PT [facility]   5/9/2022  9:30 AM [place] Ferraris, PT [facility]   5/11/2022  9:00 AM [place] Ferraris, PT [facility]   5/17/2022  9:30 AM [place] Ferraris, PT [facility]   5/19/2022  9:30 AM [place] Ferraris, PT [facility]   5/20/2022  8:30 AM Jossie Dunbar MD [facility]   9/12/2022  8:30 AM Jossie Dunbar MD [facility] POA

## 2022-04-29 ENCOUNTER — HOSPITAL ENCOUNTER (OUTPATIENT)
Dept: PHYSICAL THERAPY | Age: 57
Discharge: HOME | End: 2022-04-29
Payer: COMMERCIAL

## 2022-04-29 PROCEDURE — 97110 THERAPEUTIC EXERCISES: CPT

## 2022-04-29 NOTE — PROGRESS NOTES
Bertha Maciel  : 1965  Payor: Malinda Moder / [place]: [place] OF [address] [place] [address] Rd / Product Type: PPO /  [employer] at [address]. [facility], [address], [address] [place] [address]  [phone]   [phone]                                                          Mook Qureshi      OUTPATIENT PHYSICAL THERAPY: Daily Treatment Note 2022 Visit Count:  5    ICD-10: Treatment Diagnosis:  Pain in right knee (M25.561)  Stiffness of right knee, not elsewhere classified (M25.661)  Presence of right artificial knee joint (M96.267)  Other abnormalities of gait and mobility (R26.89)  Generalized Muscle Weakness (M62.81)              Precautions/Allergies:   Penicillins, Oxycodone-acetaminophen, and Erythromycin   Fall Risk Score: 2 (? 5 = High Risk)  MD Orders: Eval and Treat  MEDICAL/REFERRING DIAGNOSIS:  S/P total knee arthroplasty, right [Z96.651]   DATE OF ONSET: DOS 3/17/2022  REFERRING PHYSICIAN: Mook Qureshi,*  RETURN PHYSICIAN APPOINTMENT: 2022         * RIGHT KNEE ARTHROPLASTY TOTAL ROBOTIC ASSISTED PRASAD REBECCA *SDD* - Spinal (Right)               Pre-treatment Symptoms/Complaints: Patient reports soreness from doing yard work  (plan DC next week)   Pain: Initial:2/10  Medications Last Reviewed:  2022     Post Session: 2/10   Updated Objective Findings: AROM Right Knee 1-130        TREATMENT:   THERAPEUTIC EXERCISE: (43 minutes):  Exercises per grid below to improve mobility, strength and balance. Required minimal visual, verbal and manual cues to promote proper body alignment and promote proper body posture. Progressed resistance and complexity of movement as indicated.      Date:  2022 Date:  4/15/2022 Date:  2022 Date:  2022 Date:  2022   Activity/Exercise Parameters Parameters Parameters     Education HEP, POC, PT goals, anatomy/pathology       TM        Nustep  8min L3 8min L6 [place] 33 papi 8min [place] 49 papi 8min L7 [place] 53 papi   L stretch  0n40sus 7n14kjk 7e57cgu 2k81fbc   Calf stretch 3d70hwt 2k62bqq 8v72qtt 5y09jtl 4o05toz   Hamstring stretch 5c32onx  1i27cdd seated     Calf Raise  3x10 3x10 Left 2x10 Left 3x10   IT Band stretch 4l69lvi       SB knee flexion  30x      SB LE extension  30x      Quad set 21e6yck 86e1yew      SLR 10x 15x combo SAQ      Heel slide 06h16jqm 28z53zvr 39b03ukt     LAQ 15x 2x10 1#   Laser 30x   Lateral walk  3x20' no band 3x20' green 3x20' pink 3x20' purple   Monster walk  3x20' no band 3x20' green 3x20' pink 3x20' purple   Sit to stand  2x10      TKE        Cable Pull   10x  20#     Sled    3 laps 75# 3 laps 100#   Sit to Stand   2x10     Shuttle press   Bilateral 3x10 75#  R 2x10 12.5 R 2x10 25#  Bilateral 3x10 100# R 2x10 37.5#  Bilateral 3x10 125#   Squat    TRX 2x5    RDL    At shuttle At shuttle 3x5                 THERAPEUTIC ACTIVITY: ( 0 minutes): Activities per gid below to improve functional movement related mobility, strength and balance to improve neuro-muscular carryover to daily functional activities for improving patient's quality of life. Required visual, verbal and manual cues to promote proper body alignment and promote proper body posture/mechanics. Progressed resistance and complexity of movement as indicated. Date:  4/29/2022 Date:   Date:     Activity/Exercise Parameters Parameters Parameters                                                                               MANUAL THERAPY: (0 minutes): Joint mobilization, Soft tissue mobilization was utilized and necessary because of the patient's restricted joint motion and restricted motion of soft tissue mobility.         Date  4/29/2022    Technique Used Grade  Level # Time(s) Effect while being performed          Patella 4 way II III             PROM              Tissue Mobilization                           HEP Log Date 1.    4/29/2022   2.  4/29/2022   3. 4/29/2022   4.    5. [facility] Portal  Treatment/Session Summary:    Response to Treatment: Patient continues to improve with strength and tolerance to activity, no reports of pain. Communication/Consultation:  POC, HEP, Gait, Stretching   Equipment provided today: Not Today   Recommendations/Intent for next treatment session:   Next visit will focus on Manual Therapy Core Stability Pain Science Education Quad strengthening Hip strengthening soft tissue mobilization. Treatment Plan of Care Effective Dates: 4/11/2022 TO 7/10/2022 (60 days).   Frequency/Duration: 2 times a week for 90 Days             Total Treatment Billable Duration:   42  Rx   PT Patient Time In/Time Out  Time In: 0900  Time Out: [address], PT    Future Appointments   Date Time Provider Ritesh Duran   5/3/2022 11:15 AM [facility], [facility]   5/5/2022  9:30 AM Amy Miu, PT [facility]   5/9/2022  9:30 AM Amy Miu, PT [facility]   5/11/2022  9:00 AM Amy Miu, PT [facility]   5/17/2022  9:30 AM Amy Miu, PT [facility]   5/19/2022  9:30 AM Amy Miu, PT [facility]   5/20/2022  8:30 AM Keerthi Stanford MD [facility]   9/12/2022  8:30 AM Keerthi Stanford MD [facility]

## 2022-05-03 ENCOUNTER — HOSPITAL ENCOUNTER (OUTPATIENT)
Dept: PHYSICAL THERAPY | Age: 57
Discharge: HOME | End: 2022-05-03
Payer: COMMERCIAL

## 2022-05-03 PROCEDURE — 97110 THERAPEUTIC EXERCISES: CPT

## 2022-05-03 NOTE — PROGRESS NOTES
Bonnie Libman  : 1965  Payor: Tiana Kanner / Plan: [address] / Product Type: PPO /  [address] [facility] at [address] [place] [address]. [facility], [address], [address] [place] [address]  [phone]   [phone]                                                          Briseyda Woodall      OUTPATIENT PHYSICAL THERAPY: Daily Treatment Note 5/3/2022 Visit Count:  6    ICD-10: Treatment Diagnosis:  Pain in right knee (M25.561)  Stiffness of right knee, not elsewhere classified (M25.661)  Presence of right artificial knee joint (L58.710)  Other abnormalities of gait and mobility (R26.89)  Generalized Muscle Weakness (M62.81)              Precautions/Allergies:   Penicillins, Oxycodone-acetaminophen, and Erythromycin   Fall Risk Score: 2 (? 5 = High Risk)  MD Orders: Eval and Treat  MEDICAL/REFERRING DIAGNOSIS:  S/P total knee arthroplasty, right [Z96.651]   DATE OF ONSET: DOS 3/17/2022  REFERRING PHYSICIAN: Briseyda Woodall,*  RETURN PHYSICIAN APPOINTMENT: 2022         * RIGHT KNEE ARTHROPLASTY TOTAL ROBOTIC ASSISTED PRASAD MAKO *SDD* - Spinal (Right)               Pre-treatment Symptoms/Complaints: Patient reports  (plan DC next week)   Pain: Initial:2/10  Medications Last Reviewed:  5/3/2022     Post Session: 2/10   Updated Objective Findings: AROM Right Knee 1-130        TREATMENT:   THERAPEUTIC EXERCISE: (43 minutes):  Exercises per grid below to improve mobility, strength and balance. Required minimal visual, verbal and manual cues to promote proper body alignment and promote proper body posture. Progressed resistance and complexity of movement as indicated.      Date:  4/15/2022 Date:  2022 Date:  2022 Date:  2022 Date:  5/3/2022   Activity/Exercise Parameters Parameters      Education        TM        Nustep 8min L3 8min L6 [place] 33 papi 723 [facility] 53 papi 8min L4 66cal   L stretch 3t90ios 6a45adr 7v98yru 6d45fxw 1m03xax   Calf stretch 6s05zas 9z91lig 2c79mlw 4g89tpm 4p60ugs   Hamstring stretch  7y23gyy seated      Calf Raise 3x10 3x10 Left 2x10 Left 3x10    IT Band stretch        SB knee flexion 30x       SB LE extension 30x       Quad set 92n7owh       SLR 15x combo SAQ       Heel slide 57y03jfm 75m35fek      LAQ 2x10 1#   Laser 30x    Lateral walk 3x20' no band 3x20' green 3x20' pink 3x20' purple 3x20' purple w/ squat   Monster walk 3x20' no band 3x20' green 3x20' pink 3x20' purple 3x20' purple   Sit to stand 2x10       TKE        Walking Lunge     3x20'   Cable Pull  10x  20#      Sled   3 laps 75# 3 laps 100# 3 laps 100#   Sit to Stand  2x10      Shuttle press  Bilateral 3x10 75#  R 2x10 12.5 R 2x10 25#  Bilateral 3x10 100# R 2x10 37.5#  Bilateral 3x10 125#    Squat   TRX 2x5  Counter 3x5   RDL   At shuttle At shuttle 3x5 At shuttle 3x5   Stool scoot     3 laps         THERAPEUTIC ACTIVITY: ( 0 minutes): Activities per gid below to improve functional movement related mobility, strength and balance to improve neuro-muscular carryover to daily functional activities for improving patient's quality of life. Required visual, verbal and manual cues to promote proper body alignment and promote proper body posture/mechanics. Progressed resistance and complexity of movement as indicated. Date:  5/3/2022 Date:   Date:     Activity/Exercise Parameters Parameters Parameters                                                                               MANUAL THERAPY: (0 minutes): Joint mobilization, Soft tissue mobilization was utilized and necessary because of the patient's restricted joint motion and restricted motion of soft tissue mobility. Date  5/3/2022    Technique Used Grade  Level # Time(s) Effect while being performed          Patella 4 way II III             PROM              Tissue Mobilization                           HEP Log Date 1.    5/3/2022   2.  5/3/2022   3. 5/3/2022   4. 5.           [employer] Portal  Treatment/Session Summary:    Response to Treatment: Patient was challenged with increased closed chain activity. Communication/Consultation:  POC, HEP, Gait, Stretching   Equipment provided today: Not Today   Recommendations/Intent for next treatment session:   Next visit will focus on Manual Therapy Core Stability Pain Science Education Quad strengthening Hip strengthening soft tissue mobilization. Treatment Plan of Care Effective Dates: 4/11/2022 TO 7/10/2022 (60 days).   Frequency/Duration: 2 times a week for 90 Days             Total Treatment Billable Duration:   42  Rx   PT Patient Time In/Time Out  Time In: 1600  Time Out: [address], PT    Future Appointments   Date Time Provider Ritesh Duran   5/5/2022  9:30 AM Volodymyr Burciaga, PT [facility] [place] [facility]   5/9/2022  9:30 AM Volodymyr Burciaga, PT [facility]   5/11/2022  9:00 AM Volodymyr Burciaga, PT [facility]   5/17/2022  9:30 AM Volodymyr Burciaga, PT [facility]   5/19/2022  9:30 AM Volodymyr Burciaga, PT [facility]   5/20/2022  8:30 AM Florentin Jara MD [facility]   9/12/2022  8:30 AM Florentin Jara MD [facility]

## 2022-05-05 ENCOUNTER — HOSPITAL ENCOUNTER (OUTPATIENT)
Dept: PHYSICAL THERAPY | Age: 57
Discharge: HOME | End: 2022-05-05
Payer: COMMERCIAL

## 2022-05-05 PROCEDURE — 97110 THERAPEUTIC EXERCISES: CPT

## 2022-05-05 NOTE — THERAPY DISCHARGE
Al Morales  : 1965  Primary: [facility] [place]*  Secondary:  Therapy Center at . [address]  [facility]. [address], [address]  [phone]   [phone]         OUTPATIENT PHYSICAL THERAPY:Discharge 2022    ICD-10: Treatment Diagnosis:  Pain in right knee (M25.561)  Stiffness of right knee, not elsewhere classified (M25.661)  Presence of right artificial knee joint (Q51.851)  Other abnormalities of gait and mobility (R26.89)  Generalized Muscle Weakness (M62.81)          Precautions/Allergies:   Penicillins, Oxycodone-acetaminophen, and Erythromycin   Fall Risk Score: 2 (? 5 = High Risk)  MD Orders: Eval and Treat  MEDICAL/REFERRING DIAGNOSIS:  Presence of right artificial knee joint [Z96.651]   DATE OF ONSET: DOS 3/17/2022  REFERRING PHYSICIAN: Vivien Aragon,*  RETURN PHYSICIAN APPOINTMENT: 2022       * RIGHT KNEE ARTHROPLASTY TOTAL ROBOTIC ASSISTED PRASAD REBECCA *SDD* - Spinal (Right)     INITIAL ASSESSMENT:  Mr. Reyes Coronado presents to physical therapy with decreased strength, ROM, joint mobility, functional mobility and Gait. These S/S are consistent with referring diagnosis. Patient will benefit from skilled physical therapy for manual therapeutic techniques (as appropriate), therapeutic exercises and activities, balance and comprehensive home exercises program to address current impairments and functional limitations. Al Morales will benefit from skilled PT (medically necessary) in order to address above deficits affecting participation in basic ADLs and overall functional tolerance. DISCHARGE NOTE (2022): Patient has been seen for 7 sessions of physical therapy from 2022 to 2022. Patient reports feeling 90% better with all activity.  Patient has currently met most goals for Physical Therapy and is independent with comprehensive home exercise program.     PROBLEM LIST (Impacting functional limitations): 1. Decreased Strength  2. Decreased ADL/Functional Activities  3. Decreased Transfer Abilities  4. Decreased Ambulation Ability/Technique  5. Decreased Balance  6. Increased Pain  7. Decreased Joint mobility/Flexibility   8. Decreased Activity Tolerance  9. Decreased [place] with Home Exercise Program INTERVENTIONS PLANNED:   1. Balance Exercise  2. Bed Mobility  3. Cold  4. Cryotherapy  5. Family Education  6. Gait Training  7. Heat  8. Home Exercise Program (HEP)  9. Manual Therapy  10. Neuromuscular Re-education/Strengthening  11. Range of Motion (ROM)  12. Therapeutic Activites  13. Therapeutic Exercise/Strengthening  14. Transfer Training  15. Ultrasound (US)  16. Vasopneumatic Compression  17. Aquatic Therapy          TREATMENT PLAN:  Effective Dates: 4/11/2022 TO 7/10/2022 (90 days). Frequency/Duration: 2 times a week for 90 Days    GOALS: (Goals have been discussed and agreed upon with patient.)   Short-Term Goals~4 weeks  Goal Met   1. Vandana Vega will be independent with HEP for strength and ROM 1. [x] Date: 5/5/2022   2. Vandana Vega will tolerate manual therapy/joint mobilizations to increase knee flexion ROM so pt can ambulate stairs and walk with a more normalized gait pattern. 2.  [x] Date: 5/5/2022   3. Vandana Vega will participate in LE strengthening exercises for hip, knee, ankle with weight as appropriate for 3 sets of 10. 3.  [x] Date: 5/5/2022   4. Vandana Vega will tolerate scar massage as appropriate to improve tissue mobility with patient to perform independently after education 4. [x] Date: 5/5/2022   5. Vandana Vega will participate in static and dynamic balance activities for 5 minutes to help improve proprioception and decrease risk of falls 5. [x] Date: 5/5/2022   7. Vandana Vega will demonstrate Right knee flexion >= 120 degrees to improve functional mobility and tolerance of ADLs. 7.  [x] Date: 5/5/2022   8.  Vandana Vega will demonstrate Right knee extension >= 2 degrees to improve functional mobility and tolerance of ADLs 8. [x] Date: 5/5/2022   9. Mitzy An will improve MMT Right LE to >=4+/5 to improve current level of independence and community reintegration. 9.  [x] Date: 5/5/2022         Long Term Goals~8 weeks Goal Met   1. Mitzy An will be independent in HEP of stretching and strengthening 1. [x] Date: 5/5/2022   2. Mitzy An will be able to perform sit to stand transfers independently with increased knee flexion and decreased use of upper extremities 2. [x] Date: 5/5/2022   3. Mitzy An will ascend/descend 12 steps with reciprocal gait pattern and rail 3. [x] Date: 5/5/2022   4. Mitzy Simonsvelt to decrease Knee Injury and Osteoarthritis Outcome Form (KOOS-JR.) to 5 4. [] Date:           Tool Used: Knee Injury and Osteoarthritis Outcome Form (KOOS-JR.)  SCORE: None (0) Mild (1) Moderate (2) Severe (3) Extreme (4)   Stiffness:         1. How severe is your knee stiffness after first waking in the morning? [] [] [x] [] []   Pain:         What amount of knee pain have you experienced in the last week   doing the following activities? 2. Twisting/pivoting on your knee: [] [x] [] [] []   3. Straightening knee fully: [] [x] [] [] []   4. Going up or down stairs [] [x] [] [] []   5. Standing upright [] [x] [] [] []   Function, daily living        6. Rising from sitting [x] [] [] [] []   7. Bending to floor/ an object [] [x] [] [] []     Score:  Initial: 10 (Interval: 61.583) 4/11/2022/28 Most Recent: 7 (Interval: 68.28) (Date: 5/5/2022 )   Interpretation of Score: Questions each scored on a 4 point scale with 4 representing the worst possible score and 0 representing the best possible score. The higher the point total, the worse the knee pain translating to a lower percentage of patient functioning.       Observation/Orthostatic Postural Assessment:   Gait:  Antalgic right  Genu Valgus/Varus normal  Palpation:  Assessed @ Initial Visit: Tenderness to medial and lateral right knee    AROM/PROM      Joint: Eval Date: 4/11/2022  Re-Assess Date: 5/5/2022   Active ROM RIGHT LEFT RIGHT   Knee Extension 8 0 0   Knee Flexion 107 150 130   Hip Flexion WNL WNL WNL   Ankle mobility 2 10 4                           Passive ROM      Knee Extension 5 WNL WNL   Knee Flexion 110 WNL WNL     Strength:     Eval Date: 4/11/2022  Re-Assess Date: 5/5/2022     RIGHT LEFT RIGHT   Knee Flexion  4/5  5/5 5/5   Knee Extension  4/5  5/5 5/5   Hip Flexion  4+/5  5/5 4+/5   Hip Abduction  4/5  4+/5 4+/5   Hip Extension  4+/5  5/5 4+/5   Ankle Dorsiflexion   4-/5 5/5 4-/5   Ankle Planterflexion 5/5 5/5 5/5     Special Tests:   Not indicated due to Post-Surgical status      Manual:  Initial Evaluation           Joint Directon Grade Treatment Effect   Patella Lateral Glide, Medial Glide, Superior Glide and Inferior Glide III Improved Symptoms post treatment       Neurological Screen:  No radiating symptoms down leg        Balance and Mobility:  Test Result   Timed up and Go 8.5 Seconds   30 second Sit to Stand 17   Single Leg Balance Right:        <45 seconds     Left:   <60 seconds             Reason for Services/Other Comments:  Patient has been seen for 7 sessions of physical therapy from 4/11/2022 to 5/5/2022. Patient reports feeling 90% better with all activity. Patient has currently met most goals for Physical Therapy and is independent with comprehensive home exercise program.       Regarding John Sutton therapy, I certify that the treatment plan above will be carried out by a therapist or under their direction.   Thank you for this referral,  Princess [place] PT     Referring Physician Signature: Aj Nelson  No Signature Required

## 2022-05-05 NOTE — PROGRESS NOTES
Marcia Arnaldo  : 1965  Payor: Jose Luis Manzo / Plan: [address] / Product Type: PPO /  [address] [facility] at [address] [place] [address]. [facility], [address], [address] [place] [address]  [phone]   [phone]                                                          Magalys Knight      OUTPATIENT PHYSICAL THERAPY: Daily Treatment Note 2022 Visit Count:  7    ICD-10: Treatment Diagnosis:  Pain in right knee (M25.561)  Stiffness of right knee, not elsewhere classified (M25.661)  Presence of right artificial knee joint (D02.220)  Other abnormalities of gait and mobility (R26.89)  Generalized Muscle Weakness (M62.81)              Precautions/Allergies:   Penicillins, Oxycodone-acetaminophen, and Erythromycin   Fall Risk Score: 2 (? 5 = High Risk)  MD Orders: Eval and Treat  MEDICAL/REFERRING DIAGNOSIS:  S/P total knee arthroplasty, right [Z96.651]   DATE OF ONSET: DOS 3/17/2022  REFERRING PHYSICIAN: Magalys Knight,*  RETURN PHYSICIAN APPOINTMENT: 2022         * RIGHT KNEE ARTHROPLASTY TOTAL ROBOTIC ASSISTED PRASAD REBECCA *SDD* - Spinal (Right)               Pre-treatment Symptoms/Complaints: Please See Discharge Summary dated 2022 for more details. Pain: Initial:0/10  Medications Last Reviewed:  2022     Post Session: 0/10   Updated Objective Findings: Please See Discharge Summary dated 2022 for more details        TREATMENT:   THERAPEUTIC EXERCISE: (42 minutes):  Exercises per grid below to improve mobility, strength and balance. Required minimal visual, verbal and manual cues to promote proper body alignment and promote proper body posture. Progressed resistance and complexity of movement as indicated.      Date:  2022 Date:  2022 Date:  2022 Date:  5/3/2022 Date:  2022   Activity/Exercise Parameters       Education        TM        Nustep 8min [place] 53 papi 8min L4 66cal 8min L8 [place] 61 papi   L stretch 8a13izf 8o70cya 0q69uxu 0n79ycs 8r73qmq   Calf stretch 8g80tyv 7g37cxe 5t10kej 9o14qhq 7z01mly   Hamstring stretch 8l49atw seated       Calf Raise 3x10 Left 2x10 Left 3x10     IT Band stretch        SB knee flexion        SB LE extension        Quad set        SLR        Heel slide [id number]       LAQ   Laser 30x     Lateral walk 3x20' green 3x20' pink 3x20' purple 3x20' purple w/ squat 3x20' purple   Monster walk 3x20' green 3x20' pink 3x20' purple 3x20' purple 3x20' purple   Sit to stand        TKE        Walking Lunge    3x20' 3x20'   Cable Pull 10x  20#       Sled  3 laps 75# 3 laps 100# 3 laps 100# 3 laps 100#   Sit to Stand 2x10       Shuttle press Bilateral 3x10 75#  R 2x10 12.5 R 2x10 25#  Bilateral 3x10 100# R 2x10 37.5#  Bilateral 3x10 125#     Squat  TRX 2x5  Counter 3x5 Counter 3x5   RDL  At shuttle At shuttle 3x5 At shuttle 3x5 At shuttle 3x5   Stool scoot    3 laps 4 laps         THERAPEUTIC ACTIVITY: ( 0 minutes): Activities per gid below to improve functional movement related mobility, strength and balance to improve neuro-muscular carryover to daily functional activities for improving patient's quality of life. Required visual, verbal and manual cues to promote proper body alignment and promote proper body posture/mechanics. Progressed resistance and complexity of movement as indicated. Date:  5/5/2022 Date:   Date:     Activity/Exercise Parameters Parameters Parameters                                                                               MANUAL THERAPY: (0 minutes): Joint mobilization, Soft tissue mobilization was utilized and necessary because of the patient's restricted joint motion and restricted motion of soft tissue mobility.         Date  5/5/2022    Technique Used Grade  Level # Time(s) Effect while being performed          Patella 4 way II III             PROM              Tissue Mobilization HEP Log Date 1.    5/5/2022   2.  5/5/2022   3. 5/5/2022   4.    5.           [employer] Portal  Treatment/Session Summary:    Response to Treatment: Please see Discharge Summary dated 5/5/2022 for more details. Communication/Consultation:  Faxed Discharge Summary to MD.   Equipment provided today: HEP Handout   Recommendations/Intent for next treatment session:   Discharge         Treatment Plan of Care Effective Dates: 4/11/2022 TO 7/10/2022 (60 days).   Frequency/Duration: 2 times a week for 90 Days             Total Treatment Billable Duration:   42  Rx   PT Patient Time In/Time Out  Time In: 0930  Time Out: [address] PT    Future Appointments   Date Time Provider Ritesh Duran   5/9/2022  9:30 AM Kade Yair, PT [facility]   5/11/2022  9:00 AM Kade Yair, PT [facility]   5/17/2022  9:30 AM [facility]   5/19/2022  9:30 AM [facility]   5/20/2022  8:30 AM Marlin Lind MD [facility]   9/12/2022  8:30 AM Marlin Lind MD [facility]

## 2022-05-09 ENCOUNTER — APPOINTMENT (OUTPATIENT)
Dept: PHYSICAL THERAPY | Age: 57
End: 2022-05-09
Payer: COMMERCIAL

## 2022-05-11 ENCOUNTER — APPOINTMENT (OUTPATIENT)
Dept: PHYSICAL THERAPY | Age: 57
End: 2022-05-11
Payer: COMMERCIAL

## 2022-05-17 ENCOUNTER — APPOINTMENT (OUTPATIENT)
Dept: PHYSICAL THERAPY | Age: 57
End: 2022-05-17
Payer: COMMERCIAL

## 2022-05-19 ENCOUNTER — APPOINTMENT (OUTPATIENT)
Dept: PHYSICAL THERAPY | Age: 57
End: 2022-05-19
Payer: COMMERCIAL

## 2022-06-07 ENCOUNTER — HOSPITAL ENCOUNTER (OUTPATIENT)
Dept: SLEEP CENTER | Age: 57
Discharge: HOME | End: 2022-06-10
Payer: COMMERCIAL

## 2022-06-07 PROCEDURE — 95806 SLEEP STUDY UNATT&RESP EFFT: CPT

## 2022-06-09 ENCOUNTER — TELEPHONE (OUTPATIENT)
Dept: SLEEP MEDICINE | Age: 57
End: 2022-06-09

## 2022-06-15 NOTE — PROGRESS NOTES
[address] [facility] [address] [place] [address], [address] [facility]  [phone]    Patient Name:  Ivonne Joy  YOB: 1965      Office Visit 6/16/2022    CHIEF COMPLAINT:    Chief Complaint   Patient presents with    Sleep Apnea    Results    New Patient       HISTORY OF PRESENT ILLNESS:      The patient presents in outpatient consultation at the request of Mable Padgett NP for management of obstructive sleep apnea. PMH includes hypertension, arthritis and migraines. Patient had a knee replacement in March and during workup, was referred to our office for evaluation of sleep disordered breathing. He reports occasional snoring, occasionally feeling rested, morning headaches, dry mouth and body jerking. He is snoring more recently. He sleeps laterally and supine. He denies any recent weight changes. He had weight loss at the time of his study. He denies any lower extremity edema. He drinks 2-3 sodas per day. Denies tobacco or alcohol use. He typically feels sleepy after lunch and takes a nap on Sunday afternoons. Home sleep test revealed AHI 15/hr including 32 obstructive apneas and 79 hypopneas. Lowest oxygen saturation was 81 % with SpO2 less than 89% for a total of 11.3 min of the test. After discussion of treatment options, he would like to start pap therapy. His wife uses a machine and he has seen the difference in her sleep quality with using it.             SLEEP STUDY HST-6/7/22        Sleep Medicine 6/16/2022   Sitting and reading 0   Watching TV 2   Sitting, inactive in a public place (e.g. a theatre or a meeting) 0   As a passenger in a car for an hour without a break 1   Lying down to rest in the afternoon when circumstances permit 3   Sitting and talking to someone 0   Sitting quietly after a lunch without alcohol 1   In a car, while stopped for a few minutes in traffic 0   Total score 7       Past Medical History:   Diagnosis Date    HTN (hypertension)     Migraines     Personal history of COVID-19 11/2020    fatigue, loss of taste & smell         Patient Active Problem List   Diagnosis    Snoring    Arthritis of right knee    Moderate obstructive sleep apnea    Nocturnal hypoxemia           Past Surgical History:   Procedure Laterality Date    CHOLECYSTECTOMY  03/2019    COLONOSCOPY  03/2019    TOTAL HIP ARTHROPLASTY Right 12/2001    UPPER GASTROINTESTINAL ENDOSCOPY  03/2019         Social History     Socioeconomic History    Marital status:      Spouse name: Not on file    Number of children: Not on file    Years of education: Not on file    Highest education level: Not on file   Occupational History    Not on file   Tobacco Use    Smoking status: Never Smoker    Smokeless tobacco: Never Used   Substance and Sexual Activity    Alcohol use: Never    Drug use: Never    Sexual activity: Not on file   Other Topics Concern    Not on file   Social History Narrative    Not on file     Social Determinants of Health     Financial Resource Strain:     Difficulty of Paying Living Expenses: Not on file   Food Insecurity:     Worried About [address] in the Last Year: Not on file    [address] in the Last Year: Not on file   Transportation Needs:     Lack of Transportation (Medical): Not on file    Lack of Transportation (Non-Medical):  Not on file   Physical Activity:     Days of Exercise per Week: Not on file    Minutes of Exercise per Session: Not on file   Stress:     Feeling of Stress : Not on file   Social Connections:     Frequency of Communication with Friends and Family: Not on file    Frequency of Social Gatherings with Friends and Family: Not on file    Attends [employer] Services: Not on file    Active Member of Clubs or Organizations: Not on file    Attends Club or Organization Meetings: Not on file    Marital Status: Not on file   Intimate Partner Violence:     Fear of Current or Ex-Partner: Not on file   Juanito Emotionally Abused: Not on file    Physically Abused: Not on file    Sexually Abused: Not on file   Housing Stability:     Unable to Pay for Housing in the Last Year: Not on file    Number of Places Lived in the Last Year: Not on file    Unstable Housing in the Last Year: Not on file         History reviewed. No pertinent family history. Allergies   Allergen Reactions    Penicillins Other (See Comments)     Childhood allergy    Erythromycin Other (See Comments)    Oxycodone-Acetaminophen Nausea And Vomiting         Current Outpatient Medications   Medication Sig    cyclobenzaprine (FLEXERIL) 10 MG tablet Take 10 mg by mouth 3 times daily as needed    lisinopril (PRINIVIL;ZESTRIL) 10 MG tablet Take 10 mg by mouth daily    propranolol (INDERAL XL) 80 MG extended release capsule Take 80 mg by mouth daily    gabapentin (NEURONTIN) 100 MG capsule Take 100-300 mg by mouth. (Patient not taking: Reported on 6/16/2022)    ondansetron (ZOFRAN-ODT) 4 MG disintegrating tablet Take 4 mg by mouth every 4 hours as needed (Patient not taking: Reported on 6/16/2022)    promethazine (PHENERGAN) 25 MG tablet Take 25 mg by mouth every 8 hours as needed (Patient not taking: Reported on 6/16/2022)     No current facility-administered medications for this visit. REVIEW OF SYSTEMS:   CONSTITUTIONAL:   There is no history of fever, chills, night sweats, weight loss, weight gain, persistent fatigue, or lethargy/hypersomnolence. EYES:   Denies problems with eye pain, erythema, blurred vision, or visual field loss. ENTM:   Denies history of tinnitus, epistaxis, sore throat, hoarseness, or dysphonia. LYMPH:   Denies swollen glands. CARDIAC:   No chest pain, pressure, discomfort, palpitations, orthopnea, murmurs, or edema. GI:   No dysphagia, heartburn reflux, nausea/vomiting, diarrhea, abdominal pain, or bleeding. :   Denies history of dysuria, hematuria, polyuria, or decreased urine output.    MS:   No history of myalgias, arthralgias, bone pain, or muscle cramps. SKIN:   No history of rashes, jaundice, cyanosis, nodules, or ulcers. ENDO:   Negative for heat or cold intolerance. No history of DM. PSYCH:   Negative for anxiety, depression, insomnia, hallucinations. NEURO:   There is no history of AMS, persistent headache, decreased level of consciousness, seizures, or motor or sensory deficits. PHYSICAL EXAM:    Vitals:    06/16/22 1446   BP: 128/84   Pulse: 58   Resp: 14   Temp: (!) 96.6 °F (35.9 °C)   SpO2: 99%   Weight: 192 lb 11.2 oz (87.4 kg)   Height: 6' 3\" (1.905 m)   BMI 24. 09     GENERAL APPEARANCE:   The patient is normal weight and in no respiratory distress. HEENT:   PERRL. Conjunctivae unremarkable. Nasal mucosa is without epistaxis, exudate, or polyps. Gums and dentition are unremarkable. There is no oropharyngeal narrowing. TMs are clear. NECK/LYMPHATIC:   Symmetrical with no elevation of jugular venous pulsation. Trachea midline. No thyroid enlargement. No cervical adenopathy. LUNGS:   Normal respiratory effort with symmetrical lung expansion. Breath sounds clear bilaterally. HEART:   There is a regular rate and rhythm. No murmur, rub, or gallop. There is no edema in the lower extremities. ABDOMEN:   Soft and non-tender. No hepatosplenomegaly. Bowel sounds are normal.     SKIN:   There are no rashes, cyanosis, jaundice, or ecchymosis present. EXTREMITIES:   The extremities are unremarkable without clubbing, cyanosis, joint inflammation, degenerative, or ischemic change. MUSCULOSKELETAL:   There is no abnormal tone, muscle atrophy, or abnormal movement present. NEURO:   The patient is alert and oriented to person, place, and time. Memory appears intact and mood is normal.  No gross sensorimotor deficits are present. DIAGNOSTIC TESTS:  See above    ASSESSMENT:  (Medical Decision Making)        Diagnosis Orders   1.  Moderate obstructive sleep apnea - AHI 15/hr, start apap 6-15 cm  The pathophysiology of obstructive sleep apnea was reviewed with the patient. It's potential to promote severe neurologic, cardiac, pulmonary, and gastrointestinal problems was discussed. Specifically, the increased incidence of hypertension, coronary artery disease, congestive heart failure, pulmonary hypertension, gastroesophageal reflux, pathologic hypersomnolence, memory loss, and glucose intolerance was related to the consequences of hypoxemia, hypercapnia, airway obstruction, and sympathetic overdrive. We also discussed the ability of nasal CPAP to correct these abnormalities through maintenance of a patent airway. Therapeutic options including surgery, oral appliances, and weight loss were also reviewed. DME - DURABLE MEDICAL EQUIPMENT   2. Nocturnal hypoxemia - should improve on pap therapy DME - DURABLE MEDICAL EQUIPMENT          PLAN:  Start APAP 6-15 cm  Nightly compliance on therapy encouarged  Order will be sent to [address] [place] [address]  Follow up in 4 months or sooner if needed               Orders Placed This Encounter   Procedures    DME - [facility]  Phone: Who@ S [email or web address] [address] [address]  Dept: [phone]      Patient Name: Fredy Vasquez  : 1965  Gender: male  Address: [address] [place] [address]  Patient phone number: [phone] (home) [phone] (work)      Primary Insurance: Payor: Javon Ferguson / Plan: [place] / Product Type: *No Product type* /   Subscriber ID: [id number] - ([facility])      AMB Supply Order  Order Details     DME Location:    Order Date: 2022   The primary encounter diagnosis was Moderate obstructive sleep apnea. A diagnosis of Nocturnal hypoxemia was also pertinent to this visit.           (  X   )New Set-Up     apap machine   (     )[id number] CPAP Unit  (  x   ) Auto CPAP Unit  (     ) BiLevel Unit  ( ) Auto BiLevel Unit  (     ) ASV   (     ) Bilevel ST    (     ) Oxygen Concentrator         Length of need: 12 months    Pressure: 6-15  cmH20  EPR:      Starting Ramp Pressure:   cm H20  Ramp Time: min      Patient had a diagnostic Apnea Hypopnea Index (AHI) of :      *SUPPLIES* Replace all as needed, or per coverage guidelines     Masks Type:    (     ) -Full Face Mask (1 per 3 mon)  (     ) -Full Mask (1 per month) Interface/Cushion      ( x    ) -Nasal Mask (1 per 3 mon)  (   x  ) - Nasal Mask (1 per month) Interface/Cushion  (  x   ) -Pillow (2 per mon)  (  x   ) -Gxpvfdzyv (1 per 6 mon)      _________________________________________________________________          Other Supplies:    (  X   )-Hxgfrlzs (1 per 6 mon)  ( X    )-Htgpfz Tubing (1 per 3 mon)  (  X   )- Disposable Filter (2 per mon)  (   X  )-Wghomm Humidifier (1 per year)     (  x   )-Newdkjxdp (sometimes used with Full Face Mask) (1 per 6 mos)  (     )-Tubing-without heat (1 per 3 mos)  ( X   )-Non-Disposable Filter (1 per 6 mos)  (   x  )-Water Chamber (1 per 6 mos)  (     )-Humidifier non-heated (1 per 5 yrs)      Signed Date: 6/16/2022  Electronically Signed By: EMMY Kay CNP            Collaborating Physician: Dr. David Hughes     Over 50% of today's office visit was spent in face to face time reviewing test results, prognosis, importance of compliance, education about disease process, benefits of medications, instructions for management of acute flare-ups, and follow up plans. Total face to face time spent with patient was 35 minutes.     EMMY Kay CNP  Electronically signed

## 2022-06-16 ENCOUNTER — OFFICE VISIT (OUTPATIENT)
Dept: SLEEP MEDICINE | Age: 57
End: 2022-06-16
Payer: COMMERCIAL

## 2022-06-16 VITALS
HEART RATE: 58 BPM | WEIGHT: 192.7 LBS | HEIGHT: 75 IN | TEMPERATURE: 96.6 F | DIASTOLIC BLOOD PRESSURE: 84 MMHG | OXYGEN SATURATION: 99 % | RESPIRATION RATE: 14 BRPM | BODY MASS INDEX: 23.96 KG/M2 | SYSTOLIC BLOOD PRESSURE: 128 MMHG

## 2022-06-16 DIAGNOSIS — G47.34 NOCTURNAL HYPOXEMIA: ICD-10-CM

## 2022-06-16 DIAGNOSIS — G47.33 MODERATE OBSTRUCTIVE SLEEP APNEA: Primary | ICD-10-CM

## 2022-06-16 PROCEDURE — 99203 OFFICE O/P NEW LOW 30 MIN: CPT | Performed by: NURSE PRACTITIONER

## 2022-06-16 ASSESSMENT — SLEEP AND FATIGUE QUESTIONNAIRES
HOW LIKELY ARE YOU TO NOD OFF OR FALL ASLEEP WHILE WATCHING TV: 2
ESS TOTAL SCORE: 7
HOW LIKELY ARE YOU TO NOD OFF OR FALL ASLEEP IN A CAR, WHILE STOPPED FOR A FEW MINUTES IN TRAFFIC: 0
HOW LIKELY ARE YOU TO NOD OFF OR FALL ASLEEP WHILE SITTING AND READING: 0
HOW LIKELY ARE YOU TO NOD OFF OR FALL ASLEEP WHILE SITTING AND TALKING TO SOMEONE: 0
HOW LIKELY ARE YOU TO NOD OFF OR FALL ASLEEP WHEN YOU ARE A PASSENGER IN A CAR FOR AN HOUR WITHOUT A BREAK: 1
HOW LIKELY ARE YOU TO NOD OFF OR FALL ASLEEP WHILE SITTING INACTIVE IN A PUBLIC PLACE: 0
HOW LIKELY ARE YOU TO NOD OFF OR FALL ASLEEP WHILE LYING DOWN TO REST IN THE AFTERNOON WHEN CIRCUMSTANCES PERMIT: 3
HOW LIKELY ARE YOU TO NOD OFF OR FALL ASLEEP WHILE SITTING QUIETLY AFTER LUNCH WITHOUT ALCOHOL: 1

## 2022-06-16 NOTE — PATIENT INSTRUCTIONS
Start auto CPAP set to APAP 6-15 cm  Order will be sent to [address] [place] [address]. They will call you for set up. Nightly use encouraged  Follow up in 4 months or sooner if needed    The company who will be taking care of your CPAP supplies is:     Address: [address] [place] [address] [address], [address]  Phone: [phone] Option #1  Fax: [phone]

## 2022-08-14 ENCOUNTER — HOSPITAL ENCOUNTER (EMERGENCY)
Age: 57
Discharge: HOME | End: 2022-08-14
Attending: EMERGENCY MEDICINE
Payer: COMMERCIAL

## 2022-08-14 VITALS
TEMPERATURE: 98.9 F | DIASTOLIC BLOOD PRESSURE: 101 MMHG | BODY MASS INDEX: 24.25 KG/M2 | OXYGEN SATURATION: 96 % | HEIGHT: 75 IN | WEIGHT: 195 LBS | HEART RATE: 61 BPM | SYSTOLIC BLOOD PRESSURE: 141 MMHG | RESPIRATION RATE: 19 BRPM

## 2022-08-14 DIAGNOSIS — M79.675 PAIN OF LEFT GREAT TOE: Primary | ICD-10-CM

## 2022-08-14 PROCEDURE — 99283 EMERGENCY DEPT VISIT LOW MDM: CPT

## 2022-08-14 RX ORDER — INDOMETHACIN 50 MG/1
50 CAPSULE ORAL
Qty: 15 CAPSULE | Refills: 0 | Status: SHIPPED | OUTPATIENT
Start: 2022-08-14 | End: 2022-10-13 | Stop reason: ALTCHOICE

## 2022-08-14 RX ORDER — PREDNISONE 20 MG/1
40 TABLET ORAL DAILY
Qty: 10 TABLET | Refills: 0 | Status: SHIPPED | OUTPATIENT
Start: 2022-08-14 | End: 2022-08-19

## 2022-08-14 ASSESSMENT — PAIN - FUNCTIONAL ASSESSMENT
PAIN_FUNCTIONAL_ASSESSMENT: 0-10
PAIN_FUNCTIONAL_ASSESSMENT: PREVENTS OR INTERFERES SOME ACTIVE ACTIVITIES AND ADLS

## 2022-08-14 ASSESSMENT — PAIN DESCRIPTION - DESCRIPTORS: DESCRIPTORS: ACHING

## 2022-08-14 ASSESSMENT — PAIN DESCRIPTION - LOCATION: LOCATION: FOOT

## 2022-08-14 ASSESSMENT — PAIN DESCRIPTION - FREQUENCY: FREQUENCY: INTERMITTENT

## 2022-08-14 ASSESSMENT — ENCOUNTER SYMPTOMS: COLOR CHANGE: 1

## 2022-08-14 ASSESSMENT — PAIN SCALES - GENERAL: PAINLEVEL_OUTOF10: 5

## 2022-08-14 ASSESSMENT — PAIN DESCRIPTION - ORIENTATION: ORIENTATION: LEFT

## 2022-08-14 NOTE — Clinical Note
Genia German was seen and treated in our emergency department on 8/14/2022. He may return to work on 08/16/2022. If you have any questions or concerns, please don't hesitate to call.       Dian Burroughs MD

## 2022-08-14 NOTE — Clinical Note
Yoli Umanzor was seen and treated in our emergency department on 8/14/2022. He may return to work on 08/16/2022. If you have any questions or concerns, please don't hesitate to call.       Flor Mathias MD

## 2022-08-14 NOTE — DISCHARGE INSTRUCTIONS
As we discussed, if you do not notice improvement over the next 2 or 3 days, you develop more swelling, redness, fevers, or increasing pain please return for further evaluation. Follow-up with your primary care doctor for reevaluation.

## 2022-08-14 NOTE — ED NOTES
I have reviewed discharge instructions with the patient. The patient verbalized understanding. Patient left ED via Discharge Method: ambulatory to Home with Wife. Opportunity for questions and clarification provided. Patient given 2 scripts. To continue your aftercare when you leave the hospital, you may receive an automated call from our care team to check in on how you are doing. This is a free service and part of our promise to provide the best care and service to meet your aftercare needs.  If you have questions, or wish to unsubscribe from this service please call [phone]. Thank you for Choosing our [facility] Emergency Department.        Jozef Fierro RN  08/14/22 7571

## 2022-08-14 NOTE — ED TRIAGE NOTES
Patient presents to ED c/o pain in big toe of left foot. He has history of gout (self diagnosed), never been medically treated for the symptoms. Swelling and redness of left toe, painful to ambulate. Advil and tylenol taken with no relief.

## 2022-08-14 NOTE — ED PROVIDER NOTES
[facility] Emergency Department Provider Note                   PCP:                Sky Nuñez MD               Age: 49 y.o. Sex: male       ICD-10-CM    1. Pain of left great toe  M79.675           DISPOSITION Decision To Discharge 08/14/2022 02:00:39 PM        MDM  Number of Diagnoses or Management Options  Pain of left great toe  Diagnosis management comments: I will treat for gout with indomethacin and steroids. I did discuss with him the need for reevaluation if he develops any erythema, fevers, or worsening symptoms. No orders of the defined types were placed in this encounter. El Durán is a 49 y.o. male who presents to the Emergency Department with chief complaint of    Chief Complaint   Patient presents with    Foot Pain     Great toe on left foot painful, red swollen. 53-year-old gentleman presents with concerns about pain and redness to the MTP joint of his left big toe. He says he thinks it is probably gout. He says he is never actually been diagnosed with gout but has had a flareup in this area a couple of times and when he has looked into it before he thought it was most likely gout. He says he had no wounds or insect bites. He has had no fevers or chills. No other associated symptoms. Elements of this note were created using speech recognition software. As such, errors of speech recognition may be present. Review of Systems   Constitutional:  Negative for chills and fever. Musculoskeletal:  Positive for arthralgias, gait problem and joint swelling. Skin:  Positive for color change. Negative for rash and wound.      Past Medical History:   Diagnosis Date    HTN (hypertension)     Migraines     Personal history of COVID-19 11/2020    fatigue, loss of taste & smell        Past Surgical History:   Procedure Laterality Date    CHOLECYSTECTOMY  03/2019    COLONOSCOPY  03/2019    TOTAL HIP ARTHROPLASTY Right 12/2001    UPPER GASTROINTESTINAL ENDOSCOPY  03/2019        No family history on file. Social History     Socioeconomic History    Marital status:    Tobacco Use    Smoking status: Never    Smokeless tobacco: Never   Substance and Sexual Activity    Alcohol use: Never    Drug use: Never         Penicillins, Erythromycin, and Oxycodone-acetaminophen     Previous Medications    CYCLOBENZAPRINE (FLEXERIL) 10 MG TABLET    Take 10 mg by mouth 3 times daily as needed    GABAPENTIN (NEURONTIN) 100 MG CAPSULE    Take 100-300 mg by mouth. LISINOPRIL (PRINIVIL;ZESTRIL) 10 MG TABLET    Take 10 mg by mouth daily    ONDANSETRON (ZOFRAN-ODT) 4 MG DISINTEGRATING TABLET    Take 4 mg by mouth every 4 hours as needed    PROMETHAZINE (PHENERGAN) 25 MG TABLET    Take 25 mg by mouth every 8 hours as needed    PROPRANOLOL (INDERAL XL) 80 MG EXTENDED RELEASE CAPSULE    Take 80 mg by mouth daily        Vitals signs and nursing note reviewed. Patient Vitals for the past 4 hrs:   Temp Pulse Resp BP SpO2   08/14/22 1348 98.9 °F (37.2 °C) 61 19 (!) 141/101 96 %          Physical Exam  Vitals and nursing note reviewed. Constitutional:       Appearance: Normal appearance. Musculoskeletal:      Comments: Mild erythema and swelling to the MTP joint of his left big toe. It is very tender with palpation or motion. Neurological:      Mental Status: He is alert. Procedures      Labs Reviewed - No data to display     No orders to display                          Voice dictation software was used during the making of this note. This software is not perfect and grammatical and other typographical errors may be present. This note has not been completely proofread for errors.         Telma Raygoza MD  08/14/22 2998

## 2022-08-23 ENCOUNTER — HOSPITAL ENCOUNTER (EMERGENCY)
Age: 57
Discharge: HOME | End: 2022-08-24
Payer: COMMERCIAL

## 2022-08-23 DIAGNOSIS — M10.9 ACUTE GOUT INVOLVING TOE OF LEFT FOOT, UNSPECIFIED CAUSE: Primary | ICD-10-CM

## 2022-08-23 PROCEDURE — 85025 COMPLETE CBC W/AUTO DIFF WBC: CPT

## 2022-08-23 PROCEDURE — 84550 ASSAY OF BLOOD/URIC ACID: CPT

## 2022-08-23 PROCEDURE — 96372 THER/PROPH/DIAG INJ SC/IM: CPT

## 2022-08-23 PROCEDURE — 80053 COMPREHEN METABOLIC PANEL: CPT

## 2022-08-23 PROCEDURE — 99284 EMERGENCY DEPT VISIT MOD MDM: CPT

## 2022-08-23 ASSESSMENT — PAIN DESCRIPTION - LOCATION: LOCATION: TOE (COMMENT WHICH ONE)

## 2022-08-23 ASSESSMENT — PAIN SCALES - GENERAL: PAINLEVEL_OUTOF10: 9

## 2022-08-23 ASSESSMENT — PAIN - FUNCTIONAL ASSESSMENT: PAIN_FUNCTIONAL_ASSESSMENT: 0-10

## 2022-08-24 ENCOUNTER — APPOINTMENT (OUTPATIENT)
Dept: GENERAL RADIOLOGY | Age: 57
End: 2022-08-24
Payer: COMMERCIAL

## 2022-08-24 VITALS
DIASTOLIC BLOOD PRESSURE: 100 MMHG | BODY MASS INDEX: 24.25 KG/M2 | RESPIRATION RATE: 18 BRPM | SYSTOLIC BLOOD PRESSURE: 160 MMHG | OXYGEN SATURATION: 97 % | HEART RATE: 98 BPM | HEIGHT: 75 IN | WEIGHT: 195 LBS | TEMPERATURE: 97.4 F

## 2022-08-24 LAB
ALBUMIN SERPL-MCNC: 4.1 G/DL (ref 3.5–5)
ALBUMIN/GLOB SERPL: 1.9 {RATIO}
ALP SERPL-CCNC: 108 U/L (ref 45–117)
ALT SERPL-CCNC: 28 U/L (ref 13–61)
ANION GAP SERPL CALC-SCNC: 10 MMOL/L (ref 7–16)
AST SERPL-CCNC: 22 U/L (ref 15–37)
BASOPHILS # BLD: 0.1 K/UL (ref 0–0.2)
BASOPHILS NFR BLD: 1 % (ref 0–2)
BILIRUB SERPL-MCNC: 0.4 MG/DL (ref 0.2–1.1)
BUN SERPL-MCNC: 12 MG/DL (ref 7–18)
CALCIUM SERPL-MCNC: 9.4 MG/DL (ref 8.3–10.4)
CHLORIDE SERPL-SCNC: 108 MMOL/L (ref 98–107)
CO2 SERPL-SCNC: 26 MMOL/L (ref 21–32)
CREAT SERPL-MCNC: 0.88 MG/DL (ref 0.8–1.5)
DIFFERENTIAL METHOD BLD: ABNORMAL
EOSINOPHIL # BLD: 0.2 K/UL (ref 0–0.8)
EOSINOPHIL NFR BLD: 2 % (ref 0.5–7.8)
ERYTHROCYTE [DISTWIDTH] IN BLOOD BY AUTOMATED COUNT: 13 % (ref 11.9–14.6)
GLOBULIN SER CALC-MCNC: 2.2 G/DL (ref 2.3–3.5)
GLUCOSE SERPL-MCNC: 120 MG/DL (ref 65–100)
HCT VFR BLD AUTO: 36.4 % (ref 41.1–50.3)
HGB BLD-MCNC: 12.8 G/DL (ref 13.6–17.2)
IMM GRANULOCYTES # BLD AUTO: 0 K/UL (ref 0–0.5)
IMM GRANULOCYTES NFR BLD AUTO: 0 % (ref 0–5)
LYMPHOCYTES # BLD: 2.3 K/UL (ref 0.5–4.6)
LYMPHOCYTES NFR BLD: 23 % (ref 13–44)
MCH RBC QN AUTO: 31.6 PG (ref 26.1–32.9)
MCHC RBC AUTO-ENTMCNC: 35.2 G/DL (ref 31.4–35)
MCV RBC AUTO: 89.9 FL (ref 79.6–97.8)
MONOCYTES # BLD: 0.9 K/UL (ref 0.1–1.3)
MONOCYTES NFR BLD: 9 % (ref 4–12)
NEUTS SEG # BLD: 6.4 K/UL (ref 1.7–8.2)
NEUTS SEG NFR BLD: 64 % (ref 43–78)
NRBC # BLD: 0 K/UL (ref 0–0.2)
PLATELET # BLD AUTO: 242 K/UL (ref 150–450)
PMV BLD AUTO: 9.1 FL (ref 9.4–12.3)
POTASSIUM SERPL-SCNC: 3.5 MMOL/L (ref 3.5–5.1)
PROT SERPL-MCNC: 6.3 G/DL (ref 6.4–8.2)
RBC # BLD AUTO: 4.05 M/UL (ref 4.23–5.6)
SODIUM SERPL-SCNC: 144 MMOL/L (ref 138–145)
URATE SERPL-MCNC: 8.1 MG/DL (ref 2.6–6)
WBC # BLD AUTO: 10 K/UL (ref 4.3–11.1)

## 2022-08-24 PROCEDURE — 6360000002 HC RX W HCPCS

## 2022-08-24 PROCEDURE — 6370000000 HC RX 637 (ALT 250 FOR IP)

## 2022-08-24 PROCEDURE — 73630 X-RAY EXAM OF FOOT: CPT

## 2022-08-24 RX ORDER — INDOMETHACIN 50 MG/1
50 CAPSULE ORAL 2 TIMES DAILY WITH MEALS
Qty: 60 CAPSULE | Refills: 0 | Status: SHIPPED | OUTPATIENT
Start: 2022-08-24

## 2022-08-24 RX ORDER — HYDROMORPHONE HYDROCHLORIDE 2 MG/1
2 TABLET ORAL EVERY 6 HOURS PRN
Qty: 20 TABLET | Refills: 0 | Status: SHIPPED | OUTPATIENT
Start: 2022-08-24 | End: 2022-08-27

## 2022-08-24 RX ORDER — ONDANSETRON 4 MG/1
4 TABLET, ORALLY DISINTEGRATING ORAL
Status: COMPLETED | OUTPATIENT
Start: 2022-08-24 | End: 2022-08-24

## 2022-08-24 RX ORDER — HYDROMORPHONE HYDROCHLORIDE 1 MG/ML
1 INJECTION, SOLUTION INTRAMUSCULAR; INTRAVENOUS; SUBCUTANEOUS
Status: COMPLETED | OUTPATIENT
Start: 2022-08-24 | End: 2022-08-24

## 2022-08-24 RX ADMIN — HYDROMORPHONE HYDROCHLORIDE 1 MG: 1 INJECTION, SOLUTION INTRAMUSCULAR; INTRAVENOUS; SUBCUTANEOUS at 00:37

## 2022-08-24 RX ADMIN — ONDANSETRON 4 MG: 4 TABLET, ORALLY DISINTEGRATING ORAL at 00:37

## 2022-08-24 ASSESSMENT — ENCOUNTER SYMPTOMS
GASTROINTESTINAL NEGATIVE: 1
EYES NEGATIVE: 1
RESPIRATORY NEGATIVE: 1

## 2022-08-24 ASSESSMENT — PAIN SCALES - GENERAL
PAINLEVEL_OUTOF10: 6
PAINLEVEL_OUTOF10: 9

## 2022-08-24 ASSESSMENT — PAIN - FUNCTIONAL ASSESSMENT: PAIN_FUNCTIONAL_ASSESSMENT: 0-10

## 2022-08-24 NOTE — ED NOTES
I have reviewed discharge instructions with the patient. The patient verbalized understanding. Patient left ED via Discharge Method: ambulatory to Home with wife    Opportunity for questions and clarification provided. Patient given 2 scripts. To continue your aftercare when you leave the hospital, you may receive an automated call from our care team to check in on how you are doing. This is a free service and part of our promise to provide the best care and service to meet your aftercare needs.  If you have questions, or wish to unsubscribe from this service please call [phone]. Thank you for Choosing our [facility] Emergency Department.       Scot Hills RN  08/24/22 2458

## 2022-08-24 NOTE — ED TRIAGE NOTES
Presents to ED with c/o left great toe pain. Pt was seen in ED 8/14 for same. States he believes he has gout. Follow up scheduled with primary but unable to tolerate pain at home.

## 2022-08-24 NOTE — ED PROVIDER NOTES
[facility] Emergency Department Provider Note                     PCP:                Sp Santos MD               Age: 56 y.o. Sex: male           ICD-10-CM    1. Acute gout involving toe of left foot, unspecified cause  M10.9 HYDROmorphone (DILAUDID) 2 MG tablet          DISPOSITION Decision To Discharge 08/24/2022 12:28:16 AM       Discharge Medication List as of 8/24/2022 12:39 AM        START taking these medications    Details   HYDROmorphone (DILAUDID) 2 MG tablet Take 1 tablet by mouth every 6 hours as needed for Pain for up to 3 days. , Disp-20 tablet, R-0Print             MDM  Number of Diagnoses or Management Options  Acute gout involving toe of left foot, unspecified cause: established, worsening  Diagnosis management comments: Patient's MPJ is tender but not that red not that swollen. Questionable because x-rays are negative uric acid was sent however this can be an accurate wearing a flare. He is to follow-up closely with his primary care doctor. Only pain medicine he can take is Dilaudid which is worrisome we will also put him on Indocin which she was prescribed back at the last visit. But was only for 5 days. Patient has appointment on 5 with his doctor he is highly encouraged not to miss that appointment. .       Amount and/or Complexity of Data Reviewed  Clinical lab tests: ordered and reviewed  Tests in the radiology section of CPT®: ordered and reviewed  Tests in the medicine section of CPT®: ordered and reviewed  Decide to obtain previous medical records or to obtain history from someone other than the patient: yes  Review and summarize past medical records: yes  Independent visualization of images, tracings, or specimens: yes    Risk of Complications, Morbidity, and/or Mortality  Presenting problems: moderate  Diagnostic procedures: moderate  Management options: moderate    Patient Progress  Patient progress: stable      Orders Placed This Encounter   Procedures XR FOOT LEFT (MIN 3 VIEWS)    CBC with Auto Differential    Comprehensive Metabolic Panel    Uric Acid        Sanjay Crawley MD  [address] [id number] [phone]    Go to   As needed       Luis Barth is a 56 y.o. male who presents to the Emergency Department with chief complaint of    Chief Complaint   Patient presents with    Toe Pain      53-year-old male complaining of left great toe pain pain is at the J. Patient been seen in    7 days ago for the same complaint presumptive diagnosis of gout. Patient said he ate red meat/hamburger and his pain started back. Pain so bad he cannot wait till Friday to see his doctor. Review of Systems   Constitutional: Negative. Negative for activity change, appetite change, chills and fever. HENT: Negative. Eyes: Negative. Respiratory: Negative. Cardiovascular: Negative. Gastrointestinal: Negative. Endocrine: Negative. Musculoskeletal: Negative. Skin: Negative. Neurological: Negative. Hematological: Negative. Psychiatric/Behavioral: Negative. All other systems reviewed and are negative. All other systems reviewed and are negative. Past Medical History:   Diagnosis Date    HTN (hypertension)     Migraines     Personal history of COVID-19 11/2020    fatigue, loss of taste & smell        Past Surgical History:   Procedure Laterality Date    CHOLECYSTECTOMY  03/2019    COLONOSCOPY  03/2019    TOTAL HIP ARTHROPLASTY Right 12/2001    UPPER GASTROINTESTINAL ENDOSCOPY  03/2019        No family history on file. Social Connections: Not on file        Allergies   Allergen Reactions    Penicillins Other (See Comments)     Childhood allergy    Erythromycin Other (See Comments)    Oxycodone-Acetaminophen Nausea And Vomiting        Vitals signs and nursing note reviewed. No data found. Physical Exam  Vitals and nursing note reviewed. Constitutional:       Appearance: Normal appearance.  He is obese. HENT:      Head: Normocephalic. Right Ear: External ear normal.      Left Ear: External ear normal.      Nose: Nose normal. No congestion. Mouth/Throat:      Mouth: Mucous membranes are moist.      Pharynx: Oropharynx is clear. Eyes:      Extraocular Movements: Extraocular movements intact. Conjunctiva/sclera: Conjunctivae normal.      Pupils: Pupils are equal, round, and reactive to light. Cardiovascular:      Rate and Rhythm: Normal rate and regular rhythm. Pulses: Normal pulses. Heart sounds: Normal heart sounds. No murmur heard. Pulmonary:      Effort: Pulmonary effort is normal. No respiratory distress. Breath sounds: Normal breath sounds. Abdominal:      General: There is no distension. Palpations: Abdomen is soft. Tenderness: There is no abdominal tenderness. Musculoskeletal:         General: Normal range of motion. Cervical back: Normal range of motion and neck supple. No rigidity. Left foot: Bony tenderness present. Skin:     General: Skin is warm and dry. Capillary Refill: Capillary refill takes less than 2 seconds. Neurological:      General: No focal deficit present. Mental Status: He is alert. Cranial Nerves: No cranial nerve deficit. Motor: No weakness. Psychiatric:         Mood and Affect: Mood normal.         Behavior: Behavior normal.         Thought Content:  Thought content normal.         Judgment: Judgment normal.        Procedures    Labs Reviewed   CBC WITH AUTO DIFFERENTIAL - Abnormal; Notable for the following components:       Result Value    RBC 4.05 (*)     Hemoglobin 12.8 (*)     Hematocrit 36.4 (*)     MCHC 35.2 (*)     MPV 9.1 (*)     All other components within normal limits   COMPREHENSIVE METABOLIC PANEL - Abnormal; Notable for the following components:    Chloride 108 (*)     Glucose 120 (*)     Total Protein 6.3 (*)     Globulin 2.2 (*)     All other components within normal limits URIC ACID - Abnormal; Notable for the following components:    Uric Acid 8.1 (*)     All other components within normal limits        XR FOOT LEFT (MIN 3 VIEWS)   Final Result   No evidence of acute fracture or dislocation within the left foot. Danny Coma Scale  Eye Opening: Spontaneous  Best Verbal Response: Oriented  Best Motor Response: Obeys commands  Elmo Coma Scale Score: 15                     CIWA Assessment  BP: (!) 160/100  Heart Rate: 98                       Voice dictation software was used during the making of this note. This software is not perfect and grammatical and other typographical errors may be present. This note has not been completely proofread for errors.        Yancey Gilford, MD  08/24/22 [id number]

## 2022-09-12 ENCOUNTER — OFFICE VISIT (OUTPATIENT)
Dept: ORTHOPEDIC SURGERY | Age: 57
End: 2022-09-12
Payer: COMMERCIAL

## 2022-09-12 DIAGNOSIS — Z96.651 HISTORY OF TOTAL RIGHT KNEE REPLACEMENT: Primary | ICD-10-CM

## 2022-09-12 PROCEDURE — 99213 OFFICE O/P EST LOW 20 MIN: CPT | Performed by: ORTHOPAEDIC SURGERY

## 2022-09-12 NOTE — PROGRESS NOTES
Name: Ronald Quinteros  YOB: 1965  Gender: male  MRN: [id number]      Current Outpatient Medications:     indomethacin (INDOCIN) 50 MG capsule, Take 1 capsule by mouth 2 times daily (with meals), Disp: 60 capsule, Rfl: 0    indomethacin (INDOCIN) 50 MG capsule, Take 1 capsule by mouth in the morning and 1 capsule at noon and 1 capsule in the evening. Take with meals. Do all this for 5 days. , Disp: 15 capsule, Rfl: 0    cyclobenzaprine (FLEXERIL) 10 MG tablet, Take 10 mg by mouth 3 times daily as needed, Disp: , Rfl:     gabapentin (NEURONTIN) 100 MG capsule, Take 100-300 mg by mouth. (Patient not taking: Reported on 6/16/2022), Disp: , Rfl:     lisinopril (PRINIVIL;ZESTRIL) 10 MG tablet, Take 10 mg by mouth daily, Disp: , Rfl:     ondansetron (ZOFRAN-ODT) 4 MG disintegrating tablet, Take 4 mg by mouth every 4 hours as needed (Patient not taking: Reported on 6/16/2022), Disp: , Rfl:     promethazine (PHENERGAN) 25 MG tablet, Take 25 mg by mouth every 8 hours as needed (Patient not taking: Reported on 6/16/2022), Disp: , Rfl:     propranolol (INDERAL XL) 80 MG extended release capsule, Take 80 mg by mouth daily, Disp: , Rfl:   Allergies   Allergen Reactions    Penicillins Other (See Comments)     Childhood allergy    Erythromycin Other (See Comments)    Oxycodone-Acetaminophen Nausea And Vomiting       CC: Post-op right TKA 6 months    HPI: The patient is here now post total knee arthroplasty. They report that they are functioning well and denies knee pain, swelling, or instability. They report occasional soreness. No other new complaints. PMH: Reviewed and unchanged    ROS:  As per HPI; pertinent positives and negatives are addressed with the patient, particularly, those related to musculoskeletal concerns. Non-orthopaedic concerns were referred back to the primary care physician. PE:  right Knee  Patient is comfortable and in no distress.   ROM: 0 to 125 degrees  AP translation: 4 mm  M-L laxity: 2 degrees  Alignment: 4 degrees of valgus  Quadriceps strength: excellent  Gait: no limp  Incision: well healed    Radiographs: AP/Lateral and sunrise of the right knee taken in the office today reveal a good bone, prosthetic appearance. The patella is balanced. Radiographic Diagnosis:  Stable total knee arthroplasty. Impression: Status post total knee arthroplasty    Recommendations:  Reviewed x-ray findings with the patient. Activities to be advanced as tolerated. Normal lifetime restrictions as discussed. Appropriate activities for strengthening and conditioning were reviewed. Return appointment in 6 months for follow up and x-rays. Approximately 20 minutes was spent reviewing the medical record, imaging, performing history and physical examination, discussing the diagnosis and treatment plan with the patient, and completing documentation for this visit.

## 2022-10-12 NOTE — PROGRESS NOTES
[address] [place] [address] Court, [address] [facility]  [phone]    Patient Name:  Carolyn Zaldivar  YOB: 1965      Office Visit 10/13/2022    CHIEF COMPLAINT:    Chief Complaint   Patient presents with    Sleep Apnea    Follow-up         HISTORY OF PRESENT ILLNESS:  Patient is a 57 yo male seen today for follow up of DEON. Home sleep test revealed AHI 15/hr including 32 obstructive apneas and 79 hypopneas. Lowest oxygen saturation was 81 % with SpO2 less than 89% for a total of 11.3 min of the test. He is prescribed cpap therapy with a humidifier set at 6-15 cm with a nasal pillow mask. Most recent download reveals AHI on PAP therapy is 0.9, leak is 14.5 and the hourly usage is 4 hours 23 minutes nightly. The overall use is 258 hours with days greater than four hours at 35/81. The patient is compliant with the Pap therapy and is feeling better as a result. He reports that he is having some problems with feeling too much pressure and his mouth opening. He is currently using a nasal pillow mask and has tried a chinstrap but this has not helped. States that his wife also wears CPAP and has been telling him he needs a new mask. His hourly usage is right above 4 hours and he states this is due to him waking up with these issues and just taking the mask off. We discussed decreasing his max pressure down to 12 cm H2O and getting a new mask that covers the mouth and he was shown the F&P Luis fullface mask. He is in agreement with this plan. Otherwise he states he is doing well with the CPAP.     Sleep Medicine 10/13/2022 6/16/2022   Sitting and reading 0 0   Watching TV 0 2   Sitting, inactive in a public place (e.g. a theatre or a meeting) 0 0   As a passenger in a car for an hour without a break 0 1   Lying down to rest in the afternoon when circumstances permit 0 3   Sitting and talking to someone 0 0   Sitting quietly after a lunch without alcohol 0 1   In a car, while stopped for a few minutes in traffic 0 0   [place] Sleepiness Score 0 7         Past Medical History:   Diagnosis Date    HTN (hypertension)     Migraines     Personal history of COVID-19 11/2020    fatigue, loss of taste & smell         Patient Active Problem List   Diagnosis    Snoring    Arthritis of right knee    Moderate obstructive sleep apnea    Nocturnal hypoxemia          Past Surgical History:   Procedure Laterality Date    CHOLECYSTECTOMY  03/2019    COLONOSCOPY  03/2019    TOTAL HIP ARTHROPLASTY Right 12/2001    UPPER GASTROINTESTINAL ENDOSCOPY  03/2019           Social History     Socioeconomic History    Marital status:      Spouse name: Not on file    Number of children: Not on file    Years of education: Not on file    Highest education level: Not on file   Occupational History    Not on file   Tobacco Use    Smoking status: Never    Smokeless tobacco: Never   Substance and Sexual Activity    Alcohol use: Never    Drug use: Never    Sexual activity: Not on file   Other Topics Concern    Not on file   Social History Narrative    Not on file     Social Determinants of Health     Financial Resource Strain: Not on file   Food Insecurity: Not on file   Transportation Needs: Not on file   Physical Activity: Not on file   Stress: Not on file   Social Connections: Not on file   Intimate Partner Violence: Not on file   Housing Stability: Not on file         History reviewed. No pertinent family history.       Allergies   Allergen Reactions    Penicillins Other (See Comments)     Childhood allergy    Erythromycin Other (See Comments)    Oxycodone-Acetaminophen Nausea And Vomiting         Current Outpatient Medications   Medication Sig    lisinopril (PRINIVIL;ZESTRIL) 10 MG tablet Take 10 mg by mouth daily    propranolol (INDERAL XL) 80 MG extended release capsule Take 80 mg by mouth daily    indomethacin (INDOCIN) 50 MG capsule Take 1 capsule by mouth 2 times daily (with meals) (Patient not taking: Reported on 10/13/2022)    indomethacin (INDOCIN) 50 MG capsule Take 1 capsule by mouth in the morning and 1 capsule at noon and 1 capsule in the evening. Take with meals. Do all this for 5 days. No current facility-administered medications for this visit. REVIEW OF SYSTEMS:   CONSTITUTIONAL:   There is no history of fever, chills, night sweats, weight loss, weight gain, persistent fatigue, or lethargy/hypersomnolence. CARDIAC:   No chest pain, pressure, discomfort, palpitations, orthopnea, murmurs, or edema. GI:   No dysphagia, heartburn reflux, nausea/vomiting, diarrhea, abdominal pain, or bleeding. NEURO:   There is no history of AMS, persistent headache, decreased level of consciousness, seizures, or motor or sensory deficits. PHYSICAL EXAM:    Vitals:    10/13/22 1415   BP: 118/72   Pulse: 60   Resp: 15   Temp: 97.2 °F (36.2 °C)   SpO2: 97%   Weight: 196 lb 6.4 oz (89.1 kg)   Height: 6' 3\" (1.905 m)        BMI: 24.55       GENERAL APPEARANCE:   The patient is normal weight and in no respiratory distress. HEENT:   PERRL. Conjunctivae unremarkable. Nasal mucosa is without epistaxis, exudate, or polyps. Nares patent bilateral.  Gums and dentition are unremarkable. There is oropharyngeal narrowing. Dallas score 2. NECK/LYMPHATIC:   Symmetrical with no elevation of jugular venous pulsation. Trachea midline. No thyroid enlargement. No cervical adenopathy. LUNGS:   Normal respiratory effort with symmetrical lung expansion. Breath sounds clear. HEART:   There is a regular rate and rhythm. No murmur, rub, or gallop. There is no edema in the lower extremities. ABDOMEN:   Soft and non-tender. No hepatosplenomegaly. Bowel sounds are normal.     NEURO:   The patient is alert and oriented to person, place, and time. Memory appears intact and mood is normal.  No gross sensorimotor deficits are present. ASSESSMENT:  (Medical Decision Making)       ICD-10-CM    1.  DEON (obstructive sleep apnea)  G47.33 DME - DURABLE MEDICAL EQUIPMENT - Patient is using, compliant and benefiting from Pap therapy. Continue current settings as AHI is down to 0.9 events per hour. PLAN:    Continue CPAP at new pressure change 6-12 cm H2O with nightly compliance  New supply order and mask fitting for F&P Luis fullface mask  Follow-up in 6 months or sooner if needed    Orders Placed This Encounter   Procedures    DME - [address] [facility]  Phone: [address] [employer] [address] [address]  Dept: [phone]      Patient Name: Shelton Franklin  : 1965  Gender: male  Address: [address] [place] [address]  Patient phone: [phone] (home)       Primary Insurance: Payor: Shahida Serra / Plan: Shira Francis / Product Type: *No Product type* /   Subscriber ID: [id number] - ([facility])      AMB Supply Order  Order Details     DME Location: [facility]   Order Date: 10/13/2022   The encounter diagnosis was DEON (obstructive sleep apnea).              (  X   )Supplies Needed       CPAP Machine   (     ) CPAP Unit  ( x    ) Auto CPAP Unit  (     ) BiLevel Unit  (     ) Auto BiLevel Unit  (     ) ASV   (     ) Bilevel ST      Length of need: 12 months    Pressure:  6-12 cmH20  EPR: 2     Starting Ramp Pressure:  4 cm H20  Ramp Time: min  20    Patient had a diagnostic Apnea Hypopnea Index (AHI) of :  15.0  *SUPPLIES* Replace all as needed, or per coverage guidelines     Masks Type:  ( x   ) -Full Face Mask (1 per 3 mon)   <<< F&P Luis Full Face>>>  (  x  ) -Full Mask (1 per month) Interface/Cushion      (  ) -Nasal Mask (1 per 3 mon)  (  ) - Nasal Mask (1 per month) Interface/Cushion  (     ) -Pillow (2 per mon)  (     ) -Wncpnmzup (1 per 6 mon)            Other Supplies:    (   X  )-Rktbaqlr (1 per 6 mon)  (   X  )-Lkkkvt Tubing (1 per 3 mon)  (   X  )- Disposable Filter (2 per mon)  (   x  )-Soixcy Humidifier (1 per year)     ( x    )-Rjyisgbni (sometimes used with Full Face Mask) (1 per 6 mos)  (    )-Tubing-without heat (1 per 3 mos)  (     )-Non-Disposable Filter (1 per 6 mos)  (  x   )-Water Chamber (1 per 6 mos)  (     )-Humidifier non-heated (1 per 5 yrs)      Signed Date: 10/13/2022  Electronically Signed By: EMMY Orellana CNP  Electronically Dated:  10/13/2022             Collaborating Physician: Dr. Henri Camejo    Over 50% of today's office visit was spent in face to face time reviewing test results, prognosis, importance of compliance, education about disease process, benefits of medications, instructions for management of acute flare-ups, and follow up plans. Total face to face time spent with patient was 20 minutes.         EMMY Orellana CNP  Electronically signed

## 2022-10-13 ENCOUNTER — OFFICE VISIT (OUTPATIENT)
Dept: SLEEP MEDICINE | Age: 57
End: 2022-10-13
Payer: COMMERCIAL

## 2022-10-13 VITALS
DIASTOLIC BLOOD PRESSURE: 72 MMHG | HEART RATE: 60 BPM | SYSTOLIC BLOOD PRESSURE: 118 MMHG | WEIGHT: 196.4 LBS | BODY MASS INDEX: 24.42 KG/M2 | OXYGEN SATURATION: 97 % | TEMPERATURE: 97.2 F | RESPIRATION RATE: 15 BRPM | HEIGHT: 75 IN

## 2022-10-13 DIAGNOSIS — G47.33 OSA (OBSTRUCTIVE SLEEP APNEA): Primary | ICD-10-CM

## 2022-10-13 PROCEDURE — 99213 OFFICE O/P EST LOW 20 MIN: CPT | Performed by: NURSE PRACTITIONER

## 2022-10-13 ASSESSMENT — SLEEP AND FATIGUE QUESTIONNAIRES
HOW LIKELY ARE YOU TO NOD OFF OR FALL ASLEEP WHILE SITTING INACTIVE IN A PUBLIC PLACE: 0
HOW LIKELY ARE YOU TO NOD OFF OR FALL ASLEEP WHILE SITTING QUIETLY AFTER LUNCH WITHOUT ALCOHOL: 0
HOW LIKELY ARE YOU TO NOD OFF OR FALL ASLEEP WHILE LYING DOWN TO REST IN THE AFTERNOON WHEN CIRCUMSTANCES PERMIT: 0
ESS TOTAL SCORE: 0
HOW LIKELY ARE YOU TO NOD OFF OR FALL ASLEEP WHEN YOU ARE A PASSENGER IN A CAR FOR AN HOUR WITHOUT A BREAK: 0
HOW LIKELY ARE YOU TO NOD OFF OR FALL ASLEEP WHILE WATCHING TV: 0
HOW LIKELY ARE YOU TO NOD OFF OR FALL ASLEEP WHILE SITTING AND READING: 0
HOW LIKELY ARE YOU TO NOD OFF OR FALL ASLEEP WHILE SITTING AND TALKING TO SOMEONE: 0
HOW LIKELY ARE YOU TO NOD OFF OR FALL ASLEEP IN A CAR, WHILE STOPPED FOR A FEW MINUTES IN TRAFFIC: 0

## 2022-10-13 NOTE — PATIENT INSTRUCTIONS
Continue CPAP at new pressure change 6-12 cm H2O with nightly compliance  New supply order and mask fitting for F&P Luis fullface mask  Follow-up in 6 months or sooner if needed

## 2023-04-21 ENCOUNTER — OFFICE VISIT (OUTPATIENT)
Dept: ORTHOPEDIC SURGERY | Age: 58
End: 2023-04-21

## 2023-04-21 DIAGNOSIS — Z96.651 HISTORY OF TOTAL RIGHT KNEE REPLACEMENT: Primary | ICD-10-CM

## 2023-04-21 NOTE — PROGRESS NOTES
Name: Fina Luis  YOB: 1965  Gender: male  MRN: [id number]      Current Outpatient Medications:     indomethacin (INDOCIN) 50 MG capsule, Take 1 capsule by mouth 2 times daily (with meals) (Patient not taking: Reported on 10/13/2022), Disp: 60 capsule, Rfl: 0    lisinopril (PRINIVIL;ZESTRIL) 10 MG tablet, Take 10 mg by mouth daily, Disp: , Rfl:     propranolol (INDERAL XL) 80 MG extended release capsule, Take 80 mg by mouth daily, Disp: , Rfl:   Allergies   Allergen Reactions    Penicillins Other (See Comments)     Childhood allergy    Erythromycin Other (See Comments)    Oxycodone-Acetaminophen Nausea And Vomiting       CC: Post-op right TKA    HPI: The patient is here now 2 post total knee arthroplasty. They report that they are functioning well and denies knee pain, swelling, or instability. They report occasional soreness. No other new complaints. PMH: Reviewed and unchanged    ROS:  As per HPI; pertinent positives and negatives are addressed with the patient, particularly, those related to musculoskeletal concerns. Non-orthopaedic concerns were referred back to the primary care physician. PE:  right Knee  Patient is comfortable and in no distress. ROM: 0 to 125 degrees  AP translation: 4 mm  M-L laxity: 2 degrees  Alignment: 4 degrees of valgus  Quadriceps strength: excellent  Gait: no limp  Incision: well healed    Radiographs: AP/Lateral and sunrise of the right knee taken in the office today reveal a good bone, prosthetic appearance. The patella is balanced. Radiographic Diagnosis:  Stable total knee arthroplasty. Impression: Status post total knee arthroplasty    Recommendations:  Reviewed x-ray findings with the patient. Activities to be advanced as tolerated. Normal lifetime restrictions as discussed. Appropriate activities for strengthening and conditioning were reviewed. Return appointment in 2 years for follow up and x-rays.

## 2023-06-20 ENCOUNTER — TELEPHONE (OUTPATIENT)
Dept: SLEEP MEDICINE | Age: 58
End: 2023-06-20

## 2023-06-20 NOTE — PROGRESS NOTES
nightly compliance  New supply order   Recommendations as above  Follow-up in 6 months or sooner if needed    Orders Placed This Encounter   Procedures    DME - [address] [place]  Phone: [address] [address]  Dept: [phone]      Patient Name: Anali Polk  : 1965  Gender: male  Address: [address] [place] [address]  Patient phone: [phone] (home)       Primary Insurance: Payor: Arslan Washington / Plan: Dimitrios Hamilton / Product Type: *No Product type* /   Subscriber ID: [id number] - ([employer])      AMB Supply Order  Order Details     DME Location: [facility]   Order Date: 2023   The encounter diagnosis was DEON (obstructive sleep apnea).              (  X   )Supplies Needed       CPAP Machine   (     ) CPAP Unit  (     ) Auto CPAP Unit  (     ) BiLevel Unit  (     ) Auto BiLevel Unit  (     ) ASV   (     ) Bilevel ST      Length of need: 12 months    Pressure:  6-12 cmH20  EPR: 2     Starting Ramp Pressure:   cm H20  Ramp Time: min      Patient had a diagnostic Apnea Hypopnea Index (AHI) of :  15.0  *SUPPLIES* Replace all as needed, or per coverage guidelines     Masks Type:  ( x   ) -Full Face Mask (1 per 3 mon)  (  x  ) -Full Mask (1 per month) Interface/Cushion      (  ) -Nasal Mask (1 per 3 mon)  (  ) - Nasal Mask (1 per month) Interface/Cushion  (     ) -Pillow (2 per mon)  (     ) -Ckuywfwml (1 per 6 mon)            Other Supplies:    (   X  )-Rmkgfyvb (1 per 6 mon)  (   X  )-Rbzhav Tubing (1 per 3 mon)  (   X  )- Disposable Filter (2 per mon)  (   x  )-Wwifia Humidifier (1 per year)     ( x    )-Xfssnccpr (sometimes used with Full Face Mask) (1 per 6 mos)  (    )-Tubing-without heat (1 per 3 mos)  (     )-Non-Disposable Filter (1 per 6 mos)  (  x   )-Water Chamber (1 per 6 mos)  (     )-Humidifier

## 2023-06-23 ENCOUNTER — OFFICE VISIT (OUTPATIENT)
Dept: SLEEP MEDICINE | Age: 58
End: 2023-06-23
Payer: COMMERCIAL

## 2023-06-23 ENCOUNTER — TELEPHONE (OUTPATIENT)
Dept: SLEEP MEDICINE | Age: 58
End: 2023-06-23

## 2023-06-23 VITALS
HEIGHT: 75 IN | WEIGHT: 205 LBS | SYSTOLIC BLOOD PRESSURE: 137 MMHG | OXYGEN SATURATION: 100 % | HEART RATE: 58 BPM | TEMPERATURE: 97.4 F | DIASTOLIC BLOOD PRESSURE: 88 MMHG | BODY MASS INDEX: 25.49 KG/M2

## 2023-06-23 DIAGNOSIS — G47.33 OSA (OBSTRUCTIVE SLEEP APNEA): Primary | ICD-10-CM

## 2023-06-23 PROCEDURE — 99213 OFFICE O/P EST LOW 20 MIN: CPT | Performed by: NURSE PRACTITIONER

## 2023-06-23 ASSESSMENT — SLEEP AND FATIGUE QUESTIONNAIRES
HOW LIKELY ARE YOU TO NOD OFF OR FALL ASLEEP WHILE SITTING AND READING: 0
HOW LIKELY ARE YOU TO NOD OFF OR FALL ASLEEP WHILE SITTING INACTIVE IN A PUBLIC PLACE: 1
HOW LIKELY ARE YOU TO NOD OFF OR FALL ASLEEP WHILE SITTING QUIETLY AFTER LUNCH WITHOUT ALCOHOL: 0
ESS TOTAL SCORE: 5
HOW LIKELY ARE YOU TO NOD OFF OR FALL ASLEEP WHILE WATCHING TV: 1
HOW LIKELY ARE YOU TO NOD OFF OR FALL ASLEEP WHEN YOU ARE A PASSENGER IN A CAR FOR AN HOUR WITHOUT A BREAK: 2
HOW LIKELY ARE YOU TO NOD OFF OR FALL ASLEEP WHILE LYING DOWN TO REST IN THE AFTERNOON WHEN CIRCUMSTANCES PERMIT: 1
HOW LIKELY ARE YOU TO NOD OFF OR FALL ASLEEP IN A CAR, WHILE STOPPED FOR A FEW MINUTES IN TRAFFIC: 0
HOW LIKELY ARE YOU TO NOD OFF OR FALL ASLEEP WHILE SITTING AND TALKING TO SOMEONE: 0

## 2023-06-23 NOTE — PATIENT INSTRUCTIONS
Continue CPAP 6-12 cm H2O with nightly compliance  New supply order   Recommendations as above  Follow-up in 6 months or sooner if needed
08-Apr-2019 11:28

## 2024-04-30 ENCOUNTER — TELEPHONE (OUTPATIENT)
Dept: SLEEP MEDICINE | Age: 59
End: 2024-04-30

## 2024-04-30 NOTE — PROGRESS NOTES
palpitations, orthopnea, murmurs, or edema.   GI:   No dysphagia, heartburn reflux, nausea/vomiting, diarrhea, abdominal pain, or bleeding.   NEURO:   There is no history of AMS, persistent headache, decreased level of consciousness, seizures, or motor or sensory deficits.      PHYSICAL EXAM:    Vitals:    05/01/24 1510   BP: 133/79   Pulse: 50   SpO2: 96%   Weight: 95.3 kg (210 lb)   Height: 1.905 m (6' 3\")          BMI: 24.55       GENERAL APPEARANCE:   The patient is normal weight and in no respiratory distress.   HEENT:   PERRL.  Conjunctivae unremarkable.   Nasal mucosa is without epistaxis, exudate, or polyps. Nares patent bilateral.  Gums and dentition are unremarkable.  There is oropharyngeal narrowing.  Dallas score 2.    NECK/LYMPHATIC:   Symmetrical with no elevation of jugular venous pulsation.  Trachea midline. No thyroid enlargement.  No cervical adenopathy.   LUNGS:   Normal respiratory effort with symmetrical lung expansion.   Breath sounds clear.   HEART:   There is a regular rate and rhythm.  No murmur, rub, or gallop.  There is no edema in the lower extremities.   ABDOMEN:   Soft and non-tender.  No hepatosplenomegaly.  Bowel sounds are normal.     NEURO:   The patient is alert and oriented to person, place, and time.  Memory appears intact and mood is normal.  No gross sensorimotor deficits are present.          ASSESSMENT:  (Medical Decision Making)       ICD-10-CM    1. DEON (obstructive sleep apnea)  G47.33 DME - DURABLE MEDICAL EQUIPMENT - Patient is using, compliant and benefiting from Pap therapy. Continue current settings as AHI is down to 2.8 events per hour.  Strongly encouraged patient to use CPAP every night and carry his CPAP with him when he travels.              PLAN:    Continue CPAP 6-12 cm H2O with nightly compliance strongly encouraged  New supply order   Recommendations as above  Follow-up in 1 year or sooner if needed    Orders Placed This Encounter   Procedures    DME -

## 2024-05-01 ENCOUNTER — OFFICE VISIT (OUTPATIENT)
Dept: SLEEP MEDICINE | Age: 59
End: 2024-05-01
Payer: COMMERCIAL

## 2024-05-01 VITALS
WEIGHT: 210 LBS | HEIGHT: 75 IN | BODY MASS INDEX: 26.11 KG/M2 | SYSTOLIC BLOOD PRESSURE: 133 MMHG | HEART RATE: 50 BPM | OXYGEN SATURATION: 96 % | DIASTOLIC BLOOD PRESSURE: 79 MMHG

## 2024-05-01 DIAGNOSIS — G47.33 OSA (OBSTRUCTIVE SLEEP APNEA): Primary | ICD-10-CM

## 2024-05-01 PROCEDURE — 99213 OFFICE O/P EST LOW 20 MIN: CPT | Performed by: NURSE PRACTITIONER

## 2024-05-01 ASSESSMENT — SLEEP AND FATIGUE QUESTIONNAIRES
HOW LIKELY ARE YOU TO NOD OFF OR FALL ASLEEP WHILE WATCHING TV: MODERATE CHANCE OF DOZING
HOW LIKELY ARE YOU TO NOD OFF OR FALL ASLEEP WHILE SITTING AND READING: WOULD NEVER DOZE
HOW LIKELY ARE YOU TO NOD OFF OR FALL ASLEEP WHILE SITTING QUIETLY AFTER LUNCH WITHOUT ALCOHOL: WOULD NEVER DOZE
ESS TOTAL SCORE: 5
HOW LIKELY ARE YOU TO NOD OFF OR FALL ASLEEP WHILE SITTING AND TALKING TO SOMEONE: WOULD NEVER DOZE
HOW LIKELY ARE YOU TO NOD OFF OR FALL ASLEEP WHEN YOU ARE A PASSENGER IN A CAR FOR AN HOUR WITHOUT A BREAK: WOULD NEVER DOZE
HOW LIKELY ARE YOU TO NOD OFF OR FALL ASLEEP IN A CAR, WHILE STOPPED FOR A FEW MINUTES IN TRAFFIC: WOULD NEVER DOZE
HOW LIKELY ARE YOU TO NOD OFF OR FALL ASLEEP WHILE SITTING INACTIVE IN A PUBLIC PLACE: WOULD NEVER DOZE
HOW LIKELY ARE YOU TO NOD OFF OR FALL ASLEEP WHILE LYING DOWN TO REST IN THE AFTERNOON WHEN CIRCUMSTANCES PERMIT: HIGH CHANCE OF DOZING

## 2024-05-01 NOTE — PATIENT INSTRUCTIONS
Continue CPAP 6-12 cm H2O with nightly compliance strongly encouraged  New supply order   Recommendations as above  Follow-up in 1 year or sooner if needed

## 2024-11-07 ENCOUNTER — OFFICE VISIT (OUTPATIENT)
Dept: ORTHOPEDIC SURGERY | Age: 59
End: 2024-11-07

## 2024-11-07 DIAGNOSIS — M10.9 GOUTY ARTHRITIS OF LEFT GREAT TOE: ICD-10-CM

## 2024-11-07 DIAGNOSIS — M1A.0720 CHRONIC GOUT OF LEFT FOOT, UNSPECIFIED CAUSE: ICD-10-CM

## 2024-11-07 DIAGNOSIS — L03.116 CELLULITIS OF LEFT FOOT: ICD-10-CM

## 2024-11-07 DIAGNOSIS — M10.9 ACUTE GOUT OF LEFT FOOT, UNSPECIFIED CAUSE: Primary | ICD-10-CM

## 2024-11-07 PROCEDURE — M5017 MISC EVENUP: HCPCS | Performed by: ORTHOPAEDIC SURGERY

## 2024-11-07 RX ORDER — DICLOFENAC SODIUM 75 MG/1
75 TABLET, DELAYED RELEASE ORAL 2 TIMES DAILY
COMMUNITY
Start: 2024-11-05 | End: 2025-11-05

## 2024-11-07 RX ORDER — LIDOCAINE 5% 5 G/100G
CREAM TOPICAL
Qty: 45 G | Refills: 2 | Status: SHIPPED | OUTPATIENT
Start: 2024-11-07

## 2024-11-07 RX ORDER — ALLOPURINOL 300 MG/1
300 TABLET ORAL DAILY
COMMUNITY
Start: 2024-10-02 | End: 2025-10-02

## 2024-11-07 RX ORDER — COLCHICINE 0.6 MG/1
0.6 TABLET ORAL 3 TIMES DAILY
Qty: 42 TABLET | Refills: 1 | Status: SHIPPED | OUTPATIENT
Start: 2024-11-07

## 2024-11-07 RX ORDER — DOXYCYCLINE 100 MG/1
100 CAPSULE ORAL 2 TIMES DAILY
Qty: 28 CAPSULE | Refills: 2 | Status: SHIPPED | OUTPATIENT
Start: 2024-11-07

## 2024-11-07 RX ORDER — PREDNISONE 10 MG/1
TABLET ORAL
Qty: 48 EACH | Refills: 2 | Status: SHIPPED | OUTPATIENT
Start: 2024-11-07

## 2024-11-07 NOTE — PROGRESS NOTES
Patient was fitted and instructed on an Even Up for the right foot.  The patient was prescribed a walker boot for the patient's left foot. The patient wears a size NA shoe and I fitted them with a L size boot. The patient was fitted and instructed on the use of prescribed walker boot by a Registered Orthopedic Technician. I explained how to fit themselves and that the plastic flexible piece should always be on the front of the boot and secured by the Velcro straps on top. The air bladder in the boot was adjusted according to proper fit and comfort. The patient walked a short distance and acknowledged satisfaction with current fit. I also explained that they need a heel lift or a higher heeled shoe for the uninvolved LE to help normalize gait and avoid excessive low back stress/strain due to leg length inequality created from walker boot.Patient read and signed documenting they understand and agree to [facility]'s current DME return policy.   
metatarsal head subchondral cysts and medial eminence lucency with possible cortical break, surrounding soft tissue thickening     Injection: Not applicable    Assessment:    Left great toe arthritis, podagra  Left dorsolateral hindfoot soft tissue edema, redness, pain    Plan:   The patient and I discussed the above assessment. We explored treatment options.     11/07/2024: Initial visit:  Left foot pain:   Multiple years of intermittent infrequent big toe gout attacks.  2024: Multiple recurrent great toe gout attacks each attack would respond to steroids but then recur.    July 2024 Uric acid level 8.5    Over the last week, new area of concern = dorsolateral hindfoot   11/06/2024 XRs had concerns about great toe cortical break at medial eminence lucency  Exam today with no great toe pain redness or swelling or any concerns related to infection or fracture  Exam today correlates with new redness swelling and sensitivity in his dorsolateral hindfoot   He reports this is a new area of concern and I feel it relates to gout and possible cellulitis  Decision made to treat all potential possibilities    We discussed gout care and solution/solute relationship   I recommend he return to Dr. Rodrigues for new uric acid levels as he continues to have breakthroughs episodes despite Allopurinol    We discussed care and 3D boot protection   DME: 3D boot: Even up  DME medical necessity:  3D boot is necessary to provide structural stability to the forefoot and midfoot joints and protect/stabilize/support the infirmed weakened soft tissues and joints from further damage/injury    Even up is necessary to level the lower extremities to prevent overloading uninvolved contralateral extremity and prevent overloading both hips/knees and lower spine/back      PT: No indication  Orthotic/prosthetic: No indication       Medication: OTC meds prn unless prohibited by other treating MD/DO/NP/PA: Prescribed:  Before taking any pill or using

## 2024-11-14 ENCOUNTER — OFFICE VISIT (OUTPATIENT)
Dept: ORTHOPEDIC SURGERY | Age: 59
End: 2024-11-14

## 2024-11-14 DIAGNOSIS — M1A.0720 CHRONIC GOUT OF LEFT FOOT, UNSPECIFIED CAUSE: ICD-10-CM

## 2024-11-14 DIAGNOSIS — M10.9 ACUTE GOUT OF LEFT FOOT, UNSPECIFIED CAUSE: Primary | ICD-10-CM

## 2024-11-14 NOTE — PROGRESS NOTES
Name: Brody Fink  YOB: 1965  Gender: male  MRN: [id number]    11/14/2024: Much better     HPI:   11/07/2024: Initial visit: Left foot pain: Multiple years of intermittent big toe gout, but increased problems 2024    ROS/Meds/PSH/PMH/FH/SH: only reviewed pertinent/relevant information today    Tobacco:  reports that he has never smoked. He has never used smokeless tobacco.     Physical Examination:  Patient appears to be alert and oriented with acceptable appearance.  No obvious distress or SOB  CV: LE acceptable vascular flow, color and capillary refill  Neuro: LE mostly intact light touch sensation   Skin: Left = no great toe MTP swelling;resolved dorsolateral left hindfoot redness  MS: Standing: Plantigrade: Gait full  Left = resolved dorsolateral hindfoot soft tissue redness, sensitivity, pain  Left = no great toe MTP pain    XR: No new x-rays today but potential future  XR Impression:  As above      Reviewed Test/Records/Documents:   July 2024: Uric acid 8.5: [UNL 7.0]  10/22/2024: [facility] medicine [place]: Acute idiopathic gout: Left foot erythema and tenderness: Reports gout attack 3 weeks prior treated with steroid injection and then prednisone taper: Allopurinol 300 mg daily: 10/22/2024: Prescribed Prednisone 10 mg tablet 40 mg x 5 days then taper: 35 tablets:  11/06/2024: Left foot x-ray: Radiology impression: Well-defined cystic lesion along the medial margin of the first metatarsal head with possible associated nondisplaced fracture.  Recommend MRI for further evaluation of underlying bone lesion.  Mild first MTP joint degenerative changes.  Moderate regional soft tissue swelling  My review of foot films NWB x 3 with multiple first metatarsal head subchondral cysts and medial eminence lucency with possible cortical break, surrounding soft tissue thickening     Injection: Not applicable    Assessment:    Left great toe arthritis, podagra  Left dorsolateral hindfoot soft

## 2025-01-06 ENCOUNTER — HOSPITAL ENCOUNTER (OUTPATIENT)
Dept: PHYSICAL THERAPY | Age: 60
Setting detail: RECURRING SERIES
Discharge: HOME | End: 2025-01-09
Payer: COMMERCIAL

## 2025-01-06 DIAGNOSIS — M62.81 MUSCLE WEAKNESS (GENERALIZED): ICD-10-CM

## 2025-01-06 DIAGNOSIS — G44.219 EPISODIC TENSION-TYPE HEADACHE, NOT INTRACTABLE: Primary | ICD-10-CM

## 2025-01-06 PROCEDURE — 97161 PT EVAL LOW COMPLEX 20 MIN: CPT

## 2025-01-06 PROCEDURE — 97110 THERAPEUTIC EXERCISES: CPT

## 2025-01-06 NOTE — PROGRESS NOTES
Brody Fink  : 1965  Primary: [employer] ([facility] [employer])  Secondary:  [facility] [place] [address] [place] [address]  Phone: [phone]  Fax: [phone] Plan Frequency: 1x/wk for 4 weeks    Plan of Care/Certification Expiration Date: 25        Plan of Care/Certification Expiration Date:  Plan of Care/Certification Expiration Date: 25    Frequency/Duration: Plan Frequency: 1x/wk for 4 weeks      Time In/Out:   Time In: 1700  Time Out: 1735      PT Visit Info:         Visit Count:  1    OUTPATIENT PHYSICAL THERAPY:   Treatment Note 2025       Episode  (neck pain and Headache)               Treatment Diagnosis:    Episodic tension-type headache, not intractable  Muscle weakness (generalized)  Medical/Referring Diagnosis:    Radiculopathy, cervical region  Migraine without aura, intractable, without status migrainosus  Headache, unspecified      Referring Physician:  Jason Moser MD MD Orders:  PT Eval and Treat   Return MD Appt:  TBD by patient   Date of Onset:  Onset Date:  (2-3 months)     Allergies:   Penicillins, Erythromycin, and Oxycodone-acetaminophen  Restrictions/Precautions:   None      Interventions Planned (Treatment may consist of any combination of the following):     See Assessment Note    Subjective Comments:   Please see eval  Initial Pain Level::   0   /10  Post Session Pain Level:     0   /10  Medications Last Reviewed: 2025  Updated Objective Findings:  See Evaluation Note from today  Treatment     THERAPEUTIC EXERCISE: ( 10 minutes):    Exercises per grid below to improve mobility, strength, balance, and coordination. Required minimal visual, verbal, manual, and tactile cues to promote proper body mechanics.  Progressed resistance and repetitions as indicated.     Date:  2025 Date:   Date:     Activity/Exercise Parameters Parameters Parameters   Education Diagnosis, prognosis, POC, HEP,

## 2025-01-06 NOTE — THERAPY EVALUATION
noticed.  Pt reports that sometimes he will get increased pain in the neck region after heavy yardwork.    Water intake - maybe 16-32 oz of water a day  Caffiene intake - 1-2 Dr. Pepper a day    Breakfast - little kaleb cake and Dr. Boateng  Lunch - adina zhang biscuit  Dinner - pizza, chicken pot pie, hamburger casserole, soop  Snacks - almonds    Patient Stated Goal(s):  \"to have less headaches\"  Initial Pain Level:    0   /10   Post Session Pain Level:   0   /10  Past Medical History/Comorbidities:   Mr. Fink  has a past medical history of HTN (hypertension), Migraines, and Personal history of COVID-19.  Mr. Fink  has a past surgical history that includes Colonoscopy (03/2019); Upper gastrointestinal endoscopy (03/2019); Total hip arthroplasty (Right, 12/2001); and Cholecystectomy (03/2019).  Social History/Living Environment:   Patient lives with their family  Type of Home: House: One Story    Prior Level of Function/Work/Activity:   Current Level of Function: Independent   Employment Status: Employed full time  Occupation: Purchasing  Current Exercise Program/Activities: none other than heavy household chores      Learning:   Does the patient/guardian have any barriers to learning?: No barriers  Will there be a co-learner?: No  What is the preferred language of the patient/guardian?: English  Is an  required?: No  How does the patient/guardian prefer to learn new concepts?: Listening; Reading; Demonstration; Pictures/Videos    Fall Risk Scale:   [place] Total Score: 15         OBJECTIVE           Range of Motion    [] This section not tested    AROM Degrees   Cervical Flexion 68   Cervical Extension 30    Rotation Right 55    Rotation Left  56    Side-bending Right  25   Side-bend Left  25         No pain or discomfort with ROM testing, pt reporting \"tightness\" on the right that is minimal at end range, no symptoms ellicited    Strength   [] This section not tested    Motion RIGHT LEFT

## 2025-01-13 ENCOUNTER — APPOINTMENT (OUTPATIENT)
Dept: PHYSICAL THERAPY | Age: 60
End: 2025-01-13
Payer: COMMERCIAL

## 2025-01-15 ENCOUNTER — APPOINTMENT (OUTPATIENT)
Dept: PHYSICAL THERAPY | Age: 60
End: 2025-01-15
Payer: COMMERCIAL

## 2025-01-20 ENCOUNTER — HOSPITAL ENCOUNTER (OUTPATIENT)
Dept: PHYSICAL THERAPY | Age: 60
Setting detail: RECURRING SERIES
Discharge: HOME | End: 2025-01-23
Payer: COMMERCIAL

## 2025-01-20 PROCEDURE — 97110 THERAPEUTIC EXERCISES: CPT

## 2025-01-20 NOTE — THERAPY DISCHARGE
Brody Fink  : 1965  Primary: [employer] ([facility]uan LO[facility]OL[facility])  Secondary:  [facility] @ [place] [address] [address] [place] [address]  Phone: [phone]  Fax: [phone] Plan Frequency: 1x/wk for 4 weeks    Plan of Care/Certification Expiration Date: 25        Plan of Care/Certification Expiration Date:  Plan of Care/Certification Expiration Date: 25    Frequency/Duration: Plan Frequency: 1x/wk for 4 weeks      Time In/Out:   Time In: 1350  Time Out: 1430      PT Visit Info:         Visit Count:  2                OUTPATIENT PHYSICAL THERAPY:             Discharge Summary 2025               Episode (neck pain and Headache)         Treatment Diagnosis:     No data found  Medical/Referring Diagnosis:    Radiculopathy, cervical region  Migraine without aura, intractable, without status migrainosus  Headache, unspecified      Referring Physician:  Jason Moser MD MD Orders:  PT Eval and Treat   Return MD Appt:  TBD by patient  Date of Onset:  Onset Date:  (2-3 months)     Allergies:  Penicillins, Erythromycin, and Oxycodone-acetaminophen  Restrictions/Precautions:    None      Medications Last Reviewed: 2025            ASSESSMENT   DISCHARGE Assessment:  Brody Fink reports a dramatic decrease in his headaches and overall neck pain since making changes in diet and hydration status. Pt has mild stiffness that cramer snot interfere with his ADLs and IADLS. Pt and therapist decide to d/c today with HEP with pt return demo of HEP and he no longer requires skilled intervention. PT POC closed.       PLAN   Effective Dates: 2025 TO Plan of Care/Certification Expiration Date: 25     Frequency/Duration: Plan Frequency: 1x/wk for 4 weeks      Interventions Planned (Treatment may consist of any combination of the following):    Endurance Training, Functional Mobility Training, Home Exercise Program (HEP), Manual Therapy, Pain Management,

## 2025-01-20 NOTE — PROGRESS NOTES
Brody Fink  : 1965  Primary: [employer] ([employer]uan [employer])  Secondary:  [facility] @ [place] [address] [address] [place] [address]  Phone: [phone]  Fax: [phone] Plan Frequency: 1x/wk for 4 weeks    Plan of Care/Certification Expiration Date: 25        Plan of Care/Certification Expiration Date:  Plan of Care/Certification Expiration Date: 25    Frequency/Duration: Plan Frequency: 1x/wk for 4 weeks      Time In/Out:   Time In: 1350  Time Out: 1430      PT Visit Info:         Visit Count:  2    OUTPATIENT PHYSICAL THERAPY:   Treatment Note 2025       Episode  (neck pain and Headache)               Treatment Diagnosis:    Episodic tension-type headache, not intractable  Muscle weakness (generalized)  Medical/Referring Diagnosis:    Radiculopathy, cervical region  Migraine without aura, intractable, without status migrainosus  Headache, unspecified      Referring Physician:  Jason Msoer MD MD Orders:  PT Eval and Treat   Return MD Appt:  TBD by patient   Date of Onset:  Onset Date:  (2-3 months)     Allergies:   Penicillins, Erythromycin, and Oxycodone-acetaminophen  Restrictions/Precautions:   None      Interventions Planned (Treatment may consist of any combination of the following):     See Assessment Note    Subjective Comments:    Pt reports he has been gradually increasing his water intake and is up to 60 oz of water a day. Pt reports less frequent HA and just feelings of \"stiffness\" in his neck, but nothing that is interfering with his ADLs and IADLs.     Initial Pain Level::   0   /10  Post Session Pain Level:     0   /10  Medications Last Reviewed: 2025  Updated Objective Findings:  see d/c summary  Treatment     THERAPEUTIC EXERCISE: ( 40 minutes):    Exercises per grid below to improve mobility, strength, balance, and coordination. Required minimal visual, verbal, manual, and tactile cues to promote proper body mechanics.

## 2025-01-22 ENCOUNTER — APPOINTMENT (OUTPATIENT)
Dept: PHYSICAL THERAPY | Age: 60
End: 2025-01-22
Payer: COMMERCIAL

## 2025-01-27 ENCOUNTER — APPOINTMENT (OUTPATIENT)
Dept: PHYSICAL THERAPY | Age: 60
End: 2025-01-27
Payer: COMMERCIAL

## 2025-01-29 ENCOUNTER — APPOINTMENT (OUTPATIENT)
Dept: PHYSICAL THERAPY | Age: 60
End: 2025-01-29
Payer: COMMERCIAL

## 2025-04-28 ASSESSMENT — SLEEP AND FATIGUE QUESTIONNAIRES
HOW LIKELY ARE YOU TO NOD OFF OR FALL ASLEEP WHILE SITTING INACTIVE IN A PUBLIC PLACE: WOULD NEVER DOZE
HOW LIKELY ARE YOU TO NOD OFF OR FALL ASLEEP WHILE SITTING INACTIVE IN A PUBLIC PLACE: WOULD NEVER DOZE
HOW LIKELY ARE YOU TO NOD OFF OR FALL ASLEEP WHILE SITTING AND READING: SLIGHT CHANCE OF DOZING
HOW LIKELY ARE YOU TO NOD OFF OR FALL ASLEEP WHEN YOU ARE A PASSENGER IN A CAR FOR AN HOUR WITHOUT A BREAK: WOULD NEVER DOZE
HOW LIKELY ARE YOU TO NOD OFF OR FALL ASLEEP IN A CAR, WHILE STOPPED FOR A FEW MINUTES IN TRAFFIC: WOULD NEVER DOZE
HOW LIKELY ARE YOU TO NOD OFF OR FALL ASLEEP WHILE SITTING AND TALKING TO SOMEONE: WOULD NEVER DOZE
HOW LIKELY ARE YOU TO NOD OFF OR FALL ASLEEP WHILE SITTING AND READING: SLIGHT CHANCE OF DOZING
HOW LIKELY ARE YOU TO NOD OFF OR FALL ASLEEP WHILE LYING DOWN TO REST IN THE AFTERNOON WHEN CIRCUMSTANCES PERMIT: SLIGHT CHANCE OF DOZING
HOW LIKELY ARE YOU TO NOD OFF OR FALL ASLEEP WHILE WATCHING TV: SLIGHT CHANCE OF DOZING
HOW LIKELY ARE YOU TO NOD OFF OR FALL ASLEEP WHILE SITTING AND TALKING TO SOMEONE: WOULD NEVER DOZE
ESS TOTAL SCORE: 4
HOW LIKELY ARE YOU TO NOD OFF OR FALL ASLEEP WHEN YOU ARE A PASSENGER IN A CAR FOR AN HOUR WITHOUT A BREAK: WOULD NEVER DOZE
HOW LIKELY ARE YOU TO NOD OFF OR FALL ASLEEP WHILE LYING DOWN TO REST IN THE AFTERNOON WHEN CIRCUMSTANCES PERMIT: SLIGHT CHANCE OF DOZING
HOW LIKELY ARE YOU TO NOD OFF OR FALL ASLEEP WHILE SITTING QUIETLY AFTER LUNCH WITHOUT ALCOHOL: SLIGHT CHANCE OF DOZING
HOW LIKELY ARE YOU TO NOD OFF OR FALL ASLEEP WHILE SITTING QUIETLY AFTER LUNCH WITHOUT ALCOHOL: SLIGHT CHANCE OF DOZING
HOW LIKELY ARE YOU TO NOD OFF OR FALL ASLEEP WHILE WATCHING TV: SLIGHT CHANCE OF DOZING
HOW LIKELY ARE YOU TO NOD OFF OR FALL ASLEEP IN A CAR, WHILE STOPPED FOR A FEW MINUTES IN TRAFFIC: WOULD NEVER DOZE

## 2025-04-30 ENCOUNTER — TELEPHONE (OUTPATIENT)
Dept: SLEEP MEDICINE | Age: 60
End: 2025-04-30

## 2025-05-01 ENCOUNTER — OFFICE VISIT (OUTPATIENT)
Dept: SLEEP MEDICINE | Age: 60
End: 2025-05-01
Payer: COMMERCIAL

## 2025-05-01 VITALS
RESPIRATION RATE: 17 BRPM | BODY MASS INDEX: 25.74 KG/M2 | WEIGHT: 207 LBS | HEIGHT: 75 IN | DIASTOLIC BLOOD PRESSURE: 74 MMHG | SYSTOLIC BLOOD PRESSURE: 115 MMHG | HEART RATE: 61 BPM | OXYGEN SATURATION: 97 %

## 2025-05-01 DIAGNOSIS — G47.33 OSA (OBSTRUCTIVE SLEEP APNEA): Primary | ICD-10-CM

## 2025-05-01 PROCEDURE — 99213 OFFICE O/P EST LOW 20 MIN: CPT | Performed by: NURSE PRACTITIONER

## 2025-05-01 RX ORDER — NORTRIPTYLINE HYDROCHLORIDE 10 MG/1
CAPSULE ORAL
COMMUNITY

## 2025-05-01 NOTE — PATIENT INSTRUCTIONS
How to Adjust Humidity Level  My Options- press the dial  Scroll the dial down to Humidity and press dial  Turn the dial to adjust the humidity level and press dial  Scroll the dial up to Home to get to main menu

## 2025-05-01 NOTE — PROGRESS NOTES
mon)  (   X  )- Disposable Filter (2 per mon)  (   x  )-Onpxja Humidifier (1 per year)     (  x   )-Grilumyrw (sometimes used with Full Face Mask) (1 per 6 mos)  (    )-Tubing-without heat (1 per 3 mos)  (     )-Non-Disposable Filter (1 per 6 mos)  (  x   )-Water Chamber (1 per 6 mos)  (     )-Humidifier non-heated (1 per 5 yrs)      Signed Date: 5/1/2025  Electronically Signed By: EMMY Abebe CNP  Electronically Dated:  5/1/2025             Collaborating Physician: Dr. Cope    Today's office visit was spent  reviewing test results, prognosis, importance of compliance, education about disease process, benefits of medications, instructions for management of acute flare-ups, and follow up plans.  Total time spent with patient was 20 minutes.        EMMY Abebe CNP  Electronically signed

## 2025-08-05 ENCOUNTER — OFFICE VISIT (OUTPATIENT)
Dept: UROLOGY | Age: 60
End: 2025-08-05
Payer: COMMERCIAL

## 2025-08-05 DIAGNOSIS — R97.20 ELEVATED PSA: Primary | ICD-10-CM

## 2025-08-05 LAB
BILIRUBIN, URINE, POC: NEGATIVE
BLOOD URINE, POC: NEGATIVE
GLUCOSE URINE, POC: NEGATIVE MG/DL
KETONES, URINE, POC: NEGATIVE MG/DL
LEUKOCYTE ESTERASE, URINE, POC: NORMAL
NITRITE, URINE, POC: NEGATIVE
PH, URINE, POC: 6 (ref 4.6–8)
PROTEIN,URINE, POC: NEGATIVE MG/DL
SPECIFIC GRAVITY, URINE, POC: 1 (ref 1–1.03)
URINALYSIS CLARITY, POC: NORMAL
URINALYSIS COLOR, POC: NORMAL
UROBILINOGEN, POC: NORMAL MG/DL

## 2025-08-05 PROCEDURE — 99204 OFFICE O/P NEW MOD 45 MIN: CPT | Performed by: UROLOGY

## 2025-08-05 PROCEDURE — 81003 URINALYSIS AUTO W/O SCOPE: CPT | Performed by: UROLOGY

## 2025-08-05 ASSESSMENT — ENCOUNTER SYMPTOMS: BACK PAIN: 0

## (undated) DEVICE — BIPOLAR SEALER 23-112-1 AQM 6.0: Brand: AQUAMANTYS ®

## (undated) DEVICE — GUIDEPIN ORTHOPEDIC NAVIGATION 4X110 MM 2P SCREW STRL

## (undated) DEVICE — GUIDEPIN ORTHOPEDIC NAVIGATION 4X140 MM 2P SCREW STRL

## (undated) DEVICE — Device: Brand: JELCO

## (undated) DEVICE — 3M™ STERI-DRAPE™ INSTRUMENT POUCH 1018: Brand: STERI-DRAPE™

## (undated) DEVICE — SUTURE VCRL SZ 1 L27IN ABSRB UD L36MM CP-1 1/2 CIR REV CUT J268H

## (undated) DEVICE — SOLUTION IV 250ML 0.9% SOD CHL CLR INJ FLX BG CONT PRT CLSR

## (undated) DEVICE — SYR 50ML LR LCK 1ML GRAD NSAF --

## (undated) DEVICE — Z DISCONTINUED PER MEDLINE USE 2741944 DRESSING AQUACEL 12 IN SURG W9XL30CM SIL CVR WTRPRF VIR BACT BARR ANTIMIC

## (undated) DEVICE — SPONGE LAP 18X18IN STRL -- 5/PK

## (undated) DEVICE — 3M™ IOBAN™ 2 ANTIMICROBIAL INCISE DRAPE 6650EZ: Brand: IOBAN™ 2

## (undated) DEVICE — HOOD: Brand: FLYTE

## (undated) DEVICE — KIT PROC KNE TRACKING PK/1 -- VIZADISC MAKO

## (undated) DEVICE — BUTTON SWITCH PENCIL BLADE ELECTRODE, HOLSTER: Brand: EDGE

## (undated) DEVICE — SUTURE MCRYL SZ 2-0 L27IN ABSRB UD CP-1 1 L36MM 1/2 CIR REV Y266H

## (undated) DEVICE — Device

## (undated) DEVICE — KIT DRP FOR RIO ROBOTIC ARM ASST SYS

## (undated) DEVICE — REM POLYHESIVE ADULT PATIENT RETURN ELECTRODE: Brand: VALLEYLAB

## (undated) DEVICE — CORD RETRCT SIL

## (undated) DEVICE — KIT INT FIX FEM TIB CKPT MAKOPLASTY

## (undated) DEVICE — HANDPIECE SET WITH COAXIAL HIGH FLOW TIP AND SUCTION TUBE: Brand: INTERPULSE

## (undated) DEVICE — UTILITY MARKER,BLACK WITH LABELS: Brand: DEVON

## (undated) DEVICE — SUTURE PDS II SZ 1 L96IN ABSRB VLT TP-1 L65MM 1/2 CIR Z880G

## (undated) DEVICE — STERILE SLEEVE: Brand: CONVERTORS

## (undated) DEVICE — TOTAL KNEE DR KAVOLUS: Brand: MEDLINE INDUSTRIES, INC.

## (undated) DEVICE — SOLUTION IRRIG 3000ML 0.9% SOD CHL FLX CONT 0797208] ICU MEDICAL INC]

## (undated) DEVICE — STERILE PRESSURE PROTECTOR PAD® FOR DE MAYO UNIVERSAL DISTRACTOR® (10/CASE): Brand: DE MAYO UNIVERSAL DISTRACTOR®

## (undated) DEVICE — DRAPE SHT 3 QTR PROXIMA 53X77 --

## (undated) DEVICE — TRAY PREP DRY W/ PREM GLV 2 APPL 6 SPNG 2 UNDPD 1 OVERWRAP

## (undated) DEVICE — SYR LR LCK 1ML GRAD NSAF 30ML --

## (undated) DEVICE — STOCKINETTE IMPERV 12X48IN STE -- MEDICHOICE

## (undated) DEVICE — NEEDLE HYPO 21GA L1.5IN GRN POLYPR HUB S STL REG BVL STR

## (undated) DEVICE — SYR 10ML CTRL LR LCK NSAF LF --

## (undated) DEVICE — PAD,NON-ADHERENT,3X8,STERILE,LF,1/PK: Brand: MEDLINE